# Patient Record
Sex: FEMALE | Race: WHITE
[De-identification: names, ages, dates, MRNs, and addresses within clinical notes are randomized per-mention and may not be internally consistent; named-entity substitution may affect disease eponyms.]

---

## 2018-01-29 ENCOUNTER — HOSPITAL ENCOUNTER (OUTPATIENT)
Dept: HOSPITAL 25 - ED | Age: 53
Setting detail: OBSERVATION
LOS: 1 days | Discharge: HOME | End: 2018-01-30
Attending: HOSPITALIST | Admitting: HOSPITALIST
Payer: COMMERCIAL

## 2018-01-29 ENCOUNTER — HOSPITAL ENCOUNTER (EMERGENCY)
Dept: HOSPITAL 25 - UCEAST | Age: 53
Discharge: ANOTHER HOSPITAL | End: 2018-01-29
Payer: COMMERCIAL

## 2018-01-29 VITALS — DIASTOLIC BLOOD PRESSURE: 112 MMHG | SYSTOLIC BLOOD PRESSURE: 191 MMHG

## 2018-01-29 DIAGNOSIS — R51: ICD-10-CM

## 2018-01-29 DIAGNOSIS — R42: ICD-10-CM

## 2018-01-29 DIAGNOSIS — R04.0: ICD-10-CM

## 2018-01-29 DIAGNOSIS — I16.0: ICD-10-CM

## 2018-01-29 DIAGNOSIS — H92.09: ICD-10-CM

## 2018-01-29 DIAGNOSIS — R42: Primary | ICD-10-CM

## 2018-01-29 DIAGNOSIS — H66.90: Primary | ICD-10-CM

## 2018-01-29 DIAGNOSIS — Z88.2: ICD-10-CM

## 2018-01-29 DIAGNOSIS — J01.90: ICD-10-CM

## 2018-01-29 DIAGNOSIS — H65.92: ICD-10-CM

## 2018-01-29 DIAGNOSIS — R11.0: ICD-10-CM

## 2018-01-29 DIAGNOSIS — R06.02: ICD-10-CM

## 2018-01-29 DIAGNOSIS — I10: ICD-10-CM

## 2018-01-29 LAB
BASOPHILS # BLD AUTO: 0.1 10^3/UL (ref 0–0.2)
EOSINOPHIL # BLD AUTO: 0.2 10^3/UL (ref 0–0.6)
HCT VFR BLD AUTO: 40 % (ref 35–47)
HGB BLD-MCNC: 13.6 G/DL (ref 12–16)
LYMPHOCYTES # BLD AUTO: 2.1 10^3/UL (ref 1–4.8)
MCH RBC QN AUTO: 30 PG (ref 27–31)
MCHC RBC AUTO-ENTMCNC: 34 G/DL (ref 31–36)
MCV RBC AUTO: 88 FL (ref 80–97)
MONOCYTES # BLD AUTO: 0.6 10^3/UL (ref 0–0.8)
NEUTROPHILS # BLD AUTO: 5.6 10^3/UL (ref 1.5–7.7)
NRBC # BLD AUTO: 0 10^3/UL
NRBC BLD QL AUTO: 0.1
PLATELET # BLD AUTO: 331 10^3/UL (ref 150–450)
RBC # BLD AUTO: 4.56 10^6/UL (ref 4–5.4)
WBC # BLD AUTO: 8.6 10^3/UL (ref 3.5–10.8)

## 2018-01-29 PROCEDURE — 82550 ASSAY OF CK (CPK): CPT

## 2018-01-29 PROCEDURE — 99285 EMERGENCY DEPT VISIT HI MDM: CPT

## 2018-01-29 PROCEDURE — 86140 C-REACTIVE PROTEIN: CPT

## 2018-01-29 PROCEDURE — 93005 ELECTROCARDIOGRAM TRACING: CPT

## 2018-01-29 PROCEDURE — 83880 ASSAY OF NATRIURETIC PEPTIDE: CPT

## 2018-01-29 PROCEDURE — G0480 DRUG TEST DEF 1-7 CLASSES: HCPCS

## 2018-01-29 PROCEDURE — 83735 ASSAY OF MAGNESIUM: CPT

## 2018-01-29 PROCEDURE — 83605 ASSAY OF LACTIC ACID: CPT

## 2018-01-29 PROCEDURE — 80320 DRUG SCREEN QUANTALCOHOLS: CPT

## 2018-01-29 PROCEDURE — G0463 HOSPITAL OUTPT CLINIC VISIT: HCPCS

## 2018-01-29 PROCEDURE — G0378 HOSPITAL OBSERVATION PER HR: HCPCS

## 2018-01-29 PROCEDURE — 36415 COLL VENOUS BLD VENIPUNCTURE: CPT

## 2018-01-29 PROCEDURE — 71046 X-RAY EXAM CHEST 2 VIEWS: CPT

## 2018-01-29 PROCEDURE — 87502 INFLUENZA DNA AMP PROBE: CPT

## 2018-01-29 PROCEDURE — 80053 COMPREHEN METABOLIC PANEL: CPT

## 2018-01-29 PROCEDURE — 70450 CT HEAD/BRAIN W/O DYE: CPT

## 2018-01-29 PROCEDURE — 99213 OFFICE O/P EST LOW 20 MIN: CPT

## 2018-01-29 PROCEDURE — 84443 ASSAY THYROID STIM HORMONE: CPT

## 2018-01-29 PROCEDURE — 84484 ASSAY OF TROPONIN QUANT: CPT

## 2018-01-29 PROCEDURE — 85025 COMPLETE CBC W/AUTO DIFF WBC: CPT

## 2018-01-29 RX ADMIN — BUPROPION HYDROCHLORIDE SCH MG: 300 TABLET, FILM COATED, EXTENDED RELEASE ORAL at 17:25

## 2018-01-29 RX ADMIN — MECLIZINE HYDROCHLORIDE SCH MG: 12.5 TABLET ORAL at 21:33

## 2018-01-29 RX ADMIN — Medication SCH: at 21:34

## 2018-01-29 NOTE — RAD
INDICATION: Dizziness     



COMPARISON: April 04, 2012

 

TECHNIQUE: PA and lateral dual-energy views were obtained.



FINDINGS: 



Bones/Soft Tissues: There are no acute bony findings.    



Cardiomediastinal: The cardiomediastinal silhouette is normal. 



Lungs: There are no infiltrates.



Pleura: There are no pleural effusions. 



Other: None



IMPRESSION:  NO ACTIVE DISEASE.

## 2018-01-29 NOTE — HP
HISTORY AND PHYSICAL:

 

DATE OF ADMISSION:  01/29/18

 

ADMITTING PROVIDER:  Srikanth Pompa MD

 

PRIMARY CARE PROVIDER:  Dr. Holland Mullins of [place].

 

CHIEF COMPLAINT:  Dizziness with vertigo for 4 days, nausea, headaches, sinus 
pressure, inability to ambulate, hypertension.

 

HISTORY OF PRESENT ILLNESS:  Sangeeta Alva is a 52-year-old female with past 
medical history of hypertension, allergies, PTSD, anxiety, depression, also 
morbid obesity, who presents with 4 days of vertigo that seem to be getting 
better, has been taking meclizine for the last 3 days, but then it got worse 
again and presented to [facility] with blood pressures 199/112, 
additionally has had a headache, nausea, but no vomiting.  Denies any current 
shortness of breath.  Has been feeling hot, alternating with cold.  Feels 
pressure in her front sinuses and bilateral ears.  She had stopped taking her 
Ativan 1 mg b.i.d. versus t.i.d., which she takes for PTSD as she was afraid 
that it might make her feel worse.  She upon presentation to the ED, had a CT 
head which was negative, chest x-ray which is negative, lab work which is 
relatively unremarkable except for a BNP which is 527. She got Valium, ketorolac
, and Zofran in the ED, but continued to feel like she could not ambulate.  She 
is being admitted for inability to ambulate in the setting of vertigo and 
recent hypertensive urgency.

 

PAST MEDICAL HISTORY:  Anxiety, depression, PTSD, hypertension, allergies 
versus ?asthma.

 

MEDICATIONS:  Include:

1.  Propranolol 40 mg p.o. daily.

2.  Singulair 10 mg p.o. daily.

3.  Ativan 1 mg p.o. t.i.d. to b.i.d. p.r.n.

4.  Beclomethasone dipropionate 80 mcg nasal daily.

5.  Phentermine HCL 37.5 mg p.o. b.i.d.

6.  Bupropion 300 mg p.o. daily.

7.  Symbicort 2 puffs inhaled b.i.d.

8.  Meclizine 25 mg p.o. t.i.d.

 

ALLERGIES:  SULFA ANTIBIOTICS gives hives, IODINE gives swelling and shortness 
of breath.

 

FAMILY HISTORY:  Maternal grandmother with breast cancer.  Mother with lung 
cancer. Maternal grandfather with colon cancer.

 

SOCIAL HISTORY:  The patient helps to make medical devices.  She is a never 
smoker, never drinker.  No drug use.  She wishes her son, Jan Estrada and 
daughter Monica Estrada to be her medical surrogate decision makers.  She is a 
full code.

 

REVIEW OF SYSTEMS:  The patient also attests to 2 weeks of frontal sinus and 
ear pressure pain and 3 weeks of bloody (scant, noticed with tissues) nose daily
, but not perfused.  She has a history of 2 cautery procedures for nose bleed 
in her childhood.

 

                               PHYSICAL EXAMINATION

 

GENERAL:  The patient is slightly uncomfortable appearing in the hospital bed.

 

VITAL SIGNS:  Heart rate is 96; blood pressure here initially 164/116, before 
199/112 at [facility], currently 

150/92; satting 96% to 98% on room air; temperature 99.1.

 

HEENT:  Normocephalic, atraumatic.  Pupils equal, round, reactive to light.  No 
cervical lymphadenopathy.  No oropharynx lesions.

 

NECK:  Supple.

 

PULMONARY:  Clear to auscultation bilaterally with no wheezing, rales, or 
rhonchi.

 

CARDIOVASCULAR:  Regular rate and rhythm.  No murmurs, rubs, or gallops.

 

ABDOMEN:  Soft, nontender, nondistended.  Morbidly obese.  No rebound, no 
guarding. No Her's sign.

 

EXTREMITIES:  Warm and well perfused.  No peripheral edema.

 

NEUROLOGICAL:  Moving all extremities.  Cranial nerves II through XII intact.

 

SKIN:  No lesions, no rashes.

 

 LABORATORY DATA:  White count 8.6, hemoglobin 13.6, hematocrit 40, platelets 
331,000.  Sodium 138, potassium 3.9, chloride 105, carbon dioxide 27, glucose 97
, magnesium 2.0, AST 29, ALT 40, alk phos 87, , CRP 7.50, TSH 4.68.  
Influenza swabs A and B both negative.  Troponin 0.00.

 

IMAGING:  Chest x-ray, no acute process.  CT of head, no acute process.  



EKG with Q-wave in 3, borderline Q-wave in 2, unchanged.  Poor R-wave 
progression.  No ST changes.

 

ASSESSMENT AND PLAN:  Sangeeta Alva is a 52-year-old female with past medical 
history of anxiety, depression, posttraumatic stress disorder, nosebleeds, 
vertigo, who is presenting with hypertensive urgency 199/112 at urgent care. 4 
days of vertigo and inability to ambulate despite therapies in the ED which 
included meclizine, Valium and ketorolac and Benadryl.  The patient will be 
admitted to observation status to monitor her blood pressure, continue meclizine
, get Physical Therapy to work with her. Unable in ER to do a full [place] 
maneuver test. No current evidence of nystagmus on exam.  Continue Reglan p.o. 
p.r.n. for nausea. Continue her Ativan which she notably stopped over the 
weekend that may be contributing to some of her worsening feelings given her 
chronic use of 1 b.i.d. to t.i.d.  We will continue her home propranolol 40 mg 
daily, add hydralazine 10 mg IV q.2 hours p.r.n. for blood pressures above 180.
  Continue her bupropion at 300 mg for depression and her inhalers.  She is a 
full code and medical surrogates are son, Jan Estrada and Monica Estrada, her 
daughter.  She can eat a regular diet.



 

[id number]/CPS #: [id number]

TEMITOPE

## 2018-01-29 NOTE — RAD
Indication: Dizziness.



CT of the brain was performed without IV contrast.



Ventricular structures are midline. No midline shift is noted. The extra-axial spaces are

unremarkable. There is no evidence of intracranial mass or hemorrhage. No other high or

low density lesions identified.



Mastoid air cells and paranasal sinuses are unremarkable.



IMPRESSION: There is no evidence of intracranial mass or hemorrhage noted.

## 2018-01-29 NOTE — ED
Jose Maria JENSEN Angela, scribed for Ezra Rojas MD on 01/29/18 at 1020 .





Dizziness





- HPI Summary


HPI Summary: 





This pt is a 51 y/o female presenting to [facility] via EMS from [facility] c/o 

dizziness and headache since 1/26/18. Pt reports associated symptoms of headache

, nausea, epistaxis, feeling cold, diaphoresis, sinus pain, and mild SOB. She 

rates her headache 8/10 in severity and located on the bottom of her head. Pt 

denies fever, sore throat, rhinorrhea, chest pain.


Pt reports she has not taken any antihypertensive medications because she was 

sick. 





- History Of Current Complaint


Stated Complaint: DIZZINESS-SENT FROM 


Hx Obtained From: Patient


Onset/Duration: Still Present


Timing: Days


Severity Currently: Severe - 8/10


Character: Dizzy


Aggravating Factor(s): Nothing


Alleviating Factor(s): Nothing


Associated Signs And Symptoms: Positive: Nausea, Diaphoresis, SOB - mild, Chills

, Other: - POS: headache, epistaxis, sinus pain..  Negative: Vomiting, Chest 

Pain, Fever





- Allergies/Home Medications


Allergies/Adverse Reactions: 


 Allergies











Allergy/AdvReac Type Severity Reaction Status Date / Time


 


Sulfa Antibiotics Allergy Unknown Hives Verified 05/06/16 12:16


 


Iodine Allergy  Swelling/SHORT Verified 05/06/16 12:16





   OF BREATH  











Home Medications: 


 Home Medications





Beclomethasone Dipropionate (N [Qnasl] 80 mcg NASAL DAILY 01/29/18 [History 

Confirmed 01/29/18]


BuPROPion XL* [Bupropion XL*] 300 mg PO DAILY 01/29/18 [History Confirmed 01/29/ 18]


Budesonide/Formote 160/4.5(NF) [Symbicort 160/4.5 (NF)] 2 puff INH BID 01/29/18 

[History Confirmed 01/29/18]


Montelukast Sodium TAB* [Singulair TAB*] 10 mg PO DAILY 01/29/18 [History 

Confirmed 01/29/18]


Phentermine HCl [Adipex-P] 37.5 mg PO BID 01/29/18 [History Confirmed 01/29/18]











PMH/Surg Hx/FS Hx/Imm Hx


Endocrine/Hematology History: 


   Denies: Hx Diabetes, Hx Thyroid Disease


Cardiovascular History: Reports: Hx Hypertension


   Denies: Hx Pacemaker/ICD


Respiratory History: 


   Denies: Hx Asthma, Hx Chronic Obstructive Pulmonary Disease (COPD)


GI History: 


   Denies: Hx Ulcer


 History: 


   Denies: Hx Renal Disease


Sensory History: 


   Denies: Hx Hearing Aid


Psychiatric History: Reports: Hx Panic Disorder - PTSD/ANXIETY





- Cancer History


Hx Chemotherapy: No


Hx Radiation Therapy: No





- Surgical History


Surgery Procedure, Year, and Place: GALLBLADDER ,CSECTION 3049-2650,HYSTERECTOMY

,TUBAL; ear tubes; tonsilectomy





- Immunization History


Date of Tetanus Vaccine: pt states unsure


Date of Influenza Vaccine: none


Infectious Disease History: 


   Denies: Hx Clostridium Difficile, Hx Hepatitis, Hx Human Immunodeficiency 

Virus (HIV), Hx of Known/Suspected MRSA, Hx Shingles, Hx Tuberculosis, Hx Known/

Suspected VRE, Hx Known/Suspected VRSA, History Other Infectious Disease, 

Traveled Outside the [place] in Last 30 Days





- Family History


Known Family History: Positive: Hypertension, Diabetes, Other - CA





- Social History


Alcohol Use: Occasionally


Substance Use Type: Reports: None


Smoking Status (MU): Never Smoked Tobacco


Have You Smoked in the Last Year: No





Review of Systems


Positive: Chills, Skin Diaphoresis.  Negative: Fever


ENT: Other - sinus tenderness


Positive: Epistaxis.  Negative: Sore Throat, Nasal Discharge


Negative: Chest Pain


Positive: Shortness Of Breath - mild


Positive: Nausea.  Negative: Vomiting


Neurological: Other - POS: dizziness


Positive: Headache


All Other Systems Reviewed And Are Negative: Yes





Physical Exam





- Summary


Physical Exam Summary: 





VITAL SIGNS: Reviewed. 


GENERAL:  Patient is an obese female who is lying comfortable in the stretcher.

  Patient is not in any acute distress.


HEAD AND FACE: No signs of trauma.  No ecchymosis, hematomas or skull 

depressions. Pt has maxillary and ethmoid sinus tenderness.


EYES: PERRLA, EOMI x 2, No injected conjunctiva, no nystagmus. No photophobia.


EARS: Hearing grossly intact. Ear canals and tympanic membranes are within 

normal limits. 


MOUTH: Oropharynx within normal limits. 


NECK: Supple, trachea is midline, no adenopathy, no JVD, no carotid bruit, no c-

spine tenderness, neck with full ROM. No meningeal signs, no Kernig's or 

brudzinskis signs. 


CHEST: Symmetric, no tenderness at palpation 


LUNGS: Clear to auscultation bilaterally. No wheezing or crackles.


CVS: Regular rate and rhythm, S1 and S2 present, no murmurs or gallops 

appreciated. 


ABDOMEN: Soft, non-tender. No signs of distention. No rebound no guarding, and 

no masses palpated. Bowel sounds are normal. 


EXTREMITIES: FROM in all major joints, no edema, no cyanosis or clubbing.


NEURO: Alert and oriented x 3. No acute neurological deficits. Speech is normal 

and follows commands. 


SKIN: Dry and warm


GCS: 15


Triage Information Reviewed: Yes


Vital Signs On Initial Exam: 


 Initial Vitals











Temp Pulse Resp BP Pulse Ox


 


 99.1 F   96   12   164/116   98 


 


 01/29/18 10:19  01/29/18 10:19  01/29/18 10:19  01/29/18 10:19  01/29/18 10:19











Vital Signs Reviewed: Yes





Diagnostics





- Vital Signs


 Vital Signs











  Temp Pulse Resp BP Pulse Ox


 


 01/29/18 15:28  98.6 F  90  16  141/82  95


 


 01/29/18 15:00   82   141/82  97


 


 01/29/18 14:30   100   150/92  96


 


 01/29/18 14:26   98   143/88  97


 


 01/29/18 14:00   95    97


 


 01/29/18 13:58    22  


 


 01/29/18 13:41   118   162/102 


 


 01/29/18 13:33   105   162/102  96


 


 01/29/18 13:32   82    98


 


 01/29/18 13:30     138/82 


 


 01/29/18 13:00     154/91 


 


 01/29/18 12:46     159/88 


 


 01/29/18 11:50   93    97


 


 01/29/18 11:30    15  139/67 


 


 01/29/18 11:19    14  


 


 01/29/18 11:00   94  16  145/79  98


 


 01/29/18 10:30   91  12  150/124  97


 


 01/29/18 10:27   95  15  146/126  97


 


 01/29/18 10:24   95  11   97


 


 01/29/18 10:19  99.1 F  96  12  164/116  98














- Laboratory


Lab Results: 


 Lab Results











  01/29/18 01/29/18 01/29/18 Range/Units





  11:12 11:12 11:12 


 


WBC    8.6  (3.5-10.8)  10^3/ul


 


RBC    4.56  (4.0-5.4)  10^6/ul


 


Hgb    13.6  (12.0-16.0)  g/dl


 


Hct    40  (35-47)  %


 


MCV    88  (80-97)  fL


 


MCH    30  (27-31)  pg


 


MCHC    34  (31-36)  g/dl


 


RDW    14  (10.5-15)  %


 


Plt Count    331  (150-450)  10^3/ul


 


MPV    8  (7.4-10.4)  um3


 


Neut % (Auto)    65.3  (38-83)  %


 


Lymph % (Auto)    24.2 L  (25-47)  %


 


Mono % (Auto)    7.3  (1-9)  %


 


Eos % (Auto)    2.3  (0-6)  %


 


Baso % (Auto)    0.9  (0-2)  %


 


Absolute Neuts (auto)    5.6  (1.5-7.7)  10^3/ul


 


Absolute Lymphs (auto)    2.1  (1.0-4.8)  10^3/ul


 


Absolute Monos (auto)    0.6  (0-0.8)  10^3/ul


 


Absolute Eos (auto)    0.2  (0-0.6)  10^3/ul


 


Absolute Basos (auto)    0.1  (0-0.2)  10^3/ul


 


Absolute Nucleated RBC    0  10^3/ul


 


Nucleated RBC %    0.1  


 


Sodium  138    (133-145)  mmol/L


 


Potassium  3.9    (3.5-5.0)  mmol/L


 


Chloride  105    (101-111)  mmol/L


 


Carbon Dioxide  27    (22-32)  mmol/L


 


Anion Gap  6    (2-11)  mmol/L


 


BUN  12    (6-24)  mg/dL


 


Creatinine  0.85    (0.51-0.95)  mg/dL


 


Est GFR ( Amer)  90.7    (>60)  


 


Est GFR (Non-Af Amer)  70.5    (>60)  


 


BUN/Creatinine Ratio  14.1    (8-20)  


 


Glucose  97    ()  mg/dL


 


Lactic Acid     (0.5-2.0)  mmol/L


 


Calcium  10.0    (8.6-10.3)  mg/dL


 


Magnesium  2.0    (1.9-2.7)  mg/dL


 


Total Bilirubin  0.40    (0.2-1.0)  mg/dL


 


AST  29    (13-39)  U/L


 


ALT  40    (7-52)  U/L


 


Alkaline Phosphatase  87    ()  U/L


 


Total Creatine Kinase  58    ()  U/L


 


Troponin I  0.00    (<0.04)  ng/mL


 


C-Reactive Protein  7.50 H    (< 5.00)  mg/L


 


B-Natriuretic Peptide   527 H  ( - 100) pg/mL


 


Total Protein  7.5    (6.4-8.9)  g/dL


 


Albumin  4.2    (3.2-5.2)  g/dL


 


Globulin  3.3    (2-4)  g/dL


 


Albumin/Globulin Ratio  1.3    (1-3)  


 


TSH  4.68    (0.34-5.60)  mcIU/mL


 


Serum Alcohol  < 10    (<10)  mg/dL


 


Influenza A (Rapid)     (Negative)  


 


Influenza B (Rapid)     (Negative)  














  01/29/18 01/29/18 Range/Units





  11:12 11:56 


 


WBC    (3.5-10.8)  10^3/ul


 


RBC    (4.0-5.4)  10^6/ul


 


Hgb    (12.0-16.0)  g/dl


 


Hct    (35-47)  %


 


MCV    (80-97)  fL


 


MCH    (27-31)  pg


 


MCHC    (31-36)  g/dl


 


RDW    (10.5-15)  %


 


Plt Count    (150-450)  10^3/ul


 


MPV    (7.4-10.4)  um3


 


Neut % (Auto)    (38-83)  %


 


Lymph % (Auto)    (25-47)  %


 


Mono % (Auto)    (1-9)  %


 


Eos % (Auto)    (0-6)  %


 


Baso % (Auto)    (0-2)  %


 


Absolute Neuts (auto)    (1.5-7.7)  10^3/ul


 


Absolute Lymphs (auto)    (1.0-4.8)  10^3/ul


 


Absolute Monos (auto)    (0-0.8)  10^3/ul


 


Absolute Eos (auto)    (0-0.6)  10^3/ul


 


Absolute Basos (auto)    (0-0.2)  10^3/ul


 


Absolute Nucleated RBC    10^3/ul


 


Nucleated RBC %    


 


Sodium    (133-145)  mmol/L


 


Potassium    (3.5-5.0)  mmol/L


 


Chloride    (101-111)  mmol/L


 


Carbon Dioxide    (22-32)  mmol/L


 


Anion Gap    (2-11)  mmol/L


 


BUN    (6-24)  mg/dL


 


Creatinine    (0.51-0.95)  mg/dL


 


Est GFR ( Amer)    (>60)  


 


Est GFR (Non-Af Amer)    (>60)  


 


BUN/Creatinine Ratio    (8-20)  


 


Glucose    ()  mg/dL


 


Lactic Acid  0.8   (0.5-2.0)  mmol/L


 


Calcium    (8.6-10.3)  mg/dL


 


Magnesium    (1.9-2.7)  mg/dL


 


Total Bilirubin    (0.2-1.0)  mg/dL


 


AST    (13-39)  U/L


 


ALT    (7-52)  U/L


 


Alkaline Phosphatase    ()  U/L


 


Total Creatine Kinase    ()  U/L


 


Troponin I    (<0.04)  ng/mL


 


C-Reactive Protein    (< 5.00)  mg/L


 


B-Natriuretic Peptide   ( - 100) pg/mL


 


Total Protein    (6.4-8.9)  g/dL


 


Albumin    (3.2-5.2)  g/dL


 


Globulin    (2-4)  g/dL


 


Albumin/Globulin Ratio    (1-3)  


 


TSH    (0.34-5.60)  mcIU/mL


 


Serum Alcohol    (<10)  mg/dL


 


Influenza A (Rapid)   Negative  (Negative)  


 


Influenza B (Rapid)   Negative  (Negative)  











Result Diagrams: 


 01/29/18 11:12





 01/29/18 11:12


Lab Statement: Any lab studies that have been ordered have been reviewed, and 

results considered in the medical decision making process.





- Radiology


  ** chest XR


Xray Interpretation: No Acute Changes - IMPRESSION: No active disease.  has reviewed this radiology report.


Radiology Interpretation Completed By: Radiologist





- CT


  ** Brain CT


CT Interpretation: No Acute Changes - IMPRESSION: there is no evidence of 

intracranial mass or hemorrhage noted.  has reviewed this radiology 

report.


CT Interpretation Completed By: Radiologist





- EKG


  ** 11:29


Cardiac Rate: NL


EKG Rhythm: Sinus Rhythm - at 89 bpm


EKG Interpretation: No ST elevation. Q wave in II and III.





Re-Evaluation





- Re-Evaluation


  ** First Eval


Re-Evaluation Time: 12:40


Comment: I reviewed XR, CT, and lab results with the pt.





Dizzy Course/Dx





- Course


Course Of Treatment: This pt is a 51 y/o female presenting to [facility] via EMS 

from [facility] c/o dizziness and headache since 1/26/18. Pt reports associated 

symptoms of headache, nausea, epistaxis, feeling cold, diaphoresis, sinus pain, 

and mild SOB. She rates her headache 8/10 in severity and located on the bottom 

of her head. Pt denies fever, sore throat, rhinorrhea, chest pain.  Pt reports 

she has not taken any antihypertensive medications because she was sick.  Test 

results without any significant abnormalities except for CRP of 7.5 and BNP of 

527. Influenza A and B are negative. Chest XR is negative for acute pathology:  

Head CT shows there is no evidence of intracranial mass or hemorrhage noted.  I 

believe her symptoms are secondary to sinusitis as well as her vertigo. The pt 

was given Ativan for her anxiety and her blood pressure normalized. I was about 

to discharge the pt home because she reported she felt better. As soon as she 

stood up next to  her bed she felt very dizzy. She continues to have dizziness, 

which she describes as room spinning around her. Pt reports that when she lays 

down and closes her eyes, her dizziness improves. In the physical exam she has 

no nystagmus, therefore I believe the pt has peripheral dizziness. I 

additionally I gave the pt valium but her symptoms continued. I discussed the pt

s case with Dr. Pompa, hospitalist, who has accepted the pt for admission.  Pt 

is hemodynamically stable, alert and oriented x3.





- Diagnoses


Differential Diagnosis/HQI/PQRI: Benign Paroxysmal Positional Vertigo, CVA, 

Meniere's Disease, Transient Ischemic Attack, Vasovagal Reaction


Provider Diagnoses: 


 Intractable vertigo, Acute sinusitis








- Provider Notifications


Discussed Care Of Patient With: Srikanth Pompa


Time Discussed With Above Provider: 13:26


Instructed by Provider To: Other - I discussed pt care with Dr. Pompa, 

hospitalist, who has agreed to admit the pt.





Discharge





- Discharge Plan


Condition: Stable


Disposition: ADMITTED TO [facility] documentation as recorded by the Jose Maria prince Angela accurately reflects 

the service I personally performed and the decisions made by me, Ezra Rojas MD.

## 2018-01-29 NOTE — UC
Rodolfo JENSEN Julia, scribed for Kirill Whitlock MD on 01/29/18 at 0813 .





 General HPI





- HPI Summary


HPI Summary: 





This patient is a 52 year old F [place] [facility] chief complaint of 

temporal and occipital headache and dizziness since 1/19/18. Symptoms 

aggravated by lights. Patient reports diaphoresis, sinus infection, epitaxies, 

intermittent ear pain, and generalized weakness for the past two weeks. Pt 

reports vertigo like symptoms with sinus infections. Pt reports nausea with 

movement. Pt takes Meclizine for previous similar symptoms. Pt usually takes 

medication for HTN but has not since 1/19/18 due to vertigo like symptoms.





- History of Current Complaint


Stated Complaint: dizziness, and sinus congestion


Hx Obtained From: Patient


Onset/Duration: Lasting Days, Lasting Weeks


Timing: Constant


Pain Location at: head and sinuses


Character: dizziness


Aggravating: lights


Associated Signs & Symptoms: Positive: Other - diaphoresis, sinus infection, 

epitaxies, intermittent ear pain, and generalized weakness


Similar Episode/Dx as: vertigo





- Allergy/Home Medications


Allergies/Adverse Reactions: 


 Allergies











Allergy/AdvReac Type Severity Reaction Status Date / Time


 


Sulfa Antibiotics Allergy Unknown Hives Verified 05/06/16 12:16


 


Iodine Allergy  Swelling/SHORT Verified 05/06/16 12:16





   OF BREATH  











Home Medications: 


 Home Medications





Propranolol TAB* [Inderal TAB*]  01/29/18 [History]











PMH/Surg Hx/FS Hx/Imm Hx


Cardiovascular History: Hypertension





- Surgical History


Surgical History: Yes


Surgery Procedure, Year, and Place: GALLBLADDER ,CSECTION 0262-2715,HYSTERECTOMY

,TUBAL; ear tubes; tonsilectomy





- Family History


Known Family History: Positive: Hypertension, Diabetes, Other - CA





- Social History


Alcohol Use: Occasionally


Substance Use Type: None


Smoking Status (MU): Never Smoked Tobacco


Have You Smoked in the Last Year: No





Review of Systems


Constitutional: Fatigue


Skin: Other - diaphoresis


ENT: Epistaxis, Ear Ache, Sinus Congestion - infection, Sinus Pain/Tenderness


Neurological: Headache, Other - dizziness


All Other Systems Reviewed And Are Negative: Yes





Physical Exam


Triage Information Reviewed: Yes


Vital Signs: 


 Initial Vital Signs











Temp  97.6 F   01/29/18 08:12


 


Pulse  106   01/29/18 08:12


 


Resp  22   01/29/18 08:12


 


BP  191/112   01/29/18 08:12


 


Pulse Ox  98   01/29/18 08:12











Vital Signs Reviewed: Yes





- Additional Comments





General: well-appearing, moderate pain distress secondary to dizziness


Skin: warm, color reflects adequate perfusion, dry


Head: normal


Eyes: EOMI, UMM


ENT:  oral pharynx is moist, L serous otitis media


Neck: supple, nontender


Respiratory: CTA, breath sounds present


Cardiovascular: Regular rhythm with tachycardia rate


Abdomen: soft, nontender


Bowel: present


Musculoskeletal: normal, strength/ROM intact


Neurological: normal, sensory/motor intact, A&O x3, no focal neurological 

deficit


Psychological: affect/mood appropriate








Diagnostics





- EKG


Cardiac Rate: Tachycardia


Cardiac Rhythm: Sinus: Normal - 102 BPM at 8:33


Ectopy: None


ST Segment: Normal





Course/Dx





- Course


Course Of Treatment: INITIAL PRESENTATION WAS SINUSITIS AND VERTIGO THEREFORE, 

NO EKG TAKEN UPON ARRIVAL. EKG ORDERED AFTER EVAL WHICH INCLUDED HYPERTENSION 

TACHYCARDIA.  DISCUSSED GOING TO THE ED FOR FURTEHR EVALUATION AND TREATMENT.  

POSTERIOR CVA IS IN THE DDX; THIS WAS DISCUSSED WITH THE PATIENT.  SHE WILL GO 

TO THE ED BY AMBULANCE.





- Differential Dx - Multi-Symptom


Provider Diagnoses: DIZZINESS.  HEADACHE.  HYPERTENSION.  LEFT SEROUS OTITIS 

MEDIA





Discharge





- Discharge Plan


Condition: Fair


Disposition: TRANS HIGHER LVL OF CARE FAC


Patient Education Materials:  Acute Headache (ED), Hypertension (ED), Dizziness 

(ED)


Referrals: 


Nancy Mullins MD [Primary Care Provider] - 


Additional Instructions: 


GO DIRECTLY TO THE EMERGENCY DEPARTMENT FOR FURTHER EVALUATION AND CARE FOR 

YOUR HEADACHE, DIZZINESS AND HYPERTENSION.





The documentation as recorded by the Rodolfo prince Julia accurately reflects 

the service I personally performed and the decisions made by me, Kirill Whitlock MD.

## 2018-01-30 VITALS — SYSTOLIC BLOOD PRESSURE: 138 MMHG | DIASTOLIC BLOOD PRESSURE: 72 MMHG

## 2018-01-30 RX ADMIN — Medication SCH: at 07:40

## 2018-01-30 RX ADMIN — MECLIZINE HYDROCHLORIDE SCH MG: 12.5 TABLET ORAL at 14:15

## 2018-01-30 RX ADMIN — MECLIZINE HYDROCHLORIDE SCH MG: 12.5 TABLET ORAL at 06:26

## 2018-01-30 RX ADMIN — BUPROPION HYDROCHLORIDE SCH MG: 300 TABLET, FILM COATED, EXTENDED RELEASE ORAL at 09:33

## 2018-01-31 NOTE — DS
DISCHARGE SUMMARY:

 

DATE OF ADMISSION:  01/29/18

 

DATE OF DISCHARGE:  01/30/18

 

ADMITTING AND ATTENDING PHYSICIAN:  Srikanth Pompa MD

 

PRIMARY CARE PROVIDER:  Dr. Holland Mullins of [place].

 

CHIEF COMPLAINT:  Dizziness with vertigo x4 days, nausea, headaches, sinus 
pressure, ear pain, hypertension.

 

PRINCIPAL DIAGNOSES:

1.  Otitis media.

2.  Hypertensive urgency.

3.  Nose bleed.

4.  Vertigo.

 

HISTORY OF PRESENT ILLNESS AND HOSPITAL COURSE:  Sangeeta Alva is a 52-year-old 
female with past medical history of hypertension, allergies, PTSD, anxiety, 
depression, morbid obesity, nose bleeds as a child, status post 2 cautery 
procedures, who presents with 4 days of vertigo, had been taking her home 
meclizine for 3 days with some stabilization but then she states it got worse 
again.  She presented to [facility] and was noted have blood pressures 199/
112.  This was associated with headache, nausea, with no vomiting, nose bleeds 
for 3 weeks, though not profuse.  She had a history of what she describes as 
weekly sinus infections a year prior and had followed up with ENT/asthma 
specialist and got an imaging about 2 months ago.  Upon presentation to the ED, 
she had CT head which was negative, chest x-ray which was negative, lab work 
which was unremarkable except for a BNP, which is 527.  She received Valium, 
ketorolac, and Zofran and had gotten her meclizine prior but still did not feel 
safe for ambulation.  She was admitted to observation status for vertigo, 
inability to ambulate, recent  hypertensive urgency.  She saw Physical Therapy 
next day, was continued on her Ativan 1 mg p.o. b.i.d. to t.i.d., which she had 
stopped over the weekend along with Zofran p.r.n. Her symptoms, though not 
resolving, were improved and she was able to ambulate with physical therapy.  
On evaluation of her bilateral ears, she had slight effusion in the inferior 
aspect bilaterally.  No bleed on CT brain.  There was no mention of any 
abnormality of the mastoid and paranasal sinuses.  She is being discharged with 
followup with her primary care, Dr. Mullins, and also recommended to follow up 
with Ear, Nose, and Throat for her nose bleeds.  Notably, her hemoglobin on 
admission was 13.6.  Vital signs were stable and she describes what sounds like 
not a very profuse nose bleed with trace bleeding when she blows her nose.

 

MEDICATIONS:  Include:

1.  Augmentin 875 mg p.o. b.i.d. for 5 days.

2.  QNASL 80 mcg nasal daily.

3.  Symbicort 2 puffs inhaled b.i.d.

4.  Bupropion 300 mg p.o. daily.

5.  Ativan 1 mg p.o. b.i.d. to t.i.d. p.r.n.

6.  Meclizine 25 mg p.o. t.i.d.

7.  Singulair 10 mg p.o. daily.

8.  Phentermine HCL (Adipex-P) 37.5 mg p.o. b.i.d.

9.  Propranolol 40 mg p.o. daily.

 

ACTIVITY LEVEL:  No restrictions.

 

DISCHARGE DIET:  No restrictions.

 

FOLLOWUP:  Please follow up with Dr. Holland Mullins on 02/02/18 at 03:30 p.m. and 
with her Ear, Nose, and Throat specialist if nose bleeds continue.

 

TIME SPENT ON DISCHARGE:  30 minutes.

 

 [id number]/CPS #: [id number]

MTDD

## 2019-08-12 ENCOUNTER — HOSPITAL ENCOUNTER (EMERGENCY)
Dept: HOSPITAL 25 - UCEAST | Age: 54
Discharge: HOME HEALTH | End: 2019-08-12
Payer: COMMERCIAL

## 2019-08-12 ENCOUNTER — HOSPITAL ENCOUNTER (EMERGENCY)
Dept: HOSPITAL 25 - ED | Age: 54
Discharge: HOME | End: 2019-08-12
Payer: COMMERCIAL

## 2019-08-12 VITALS — SYSTOLIC BLOOD PRESSURE: 136 MMHG | DIASTOLIC BLOOD PRESSURE: 62 MMHG

## 2019-08-12 DIAGNOSIS — I10: ICD-10-CM

## 2019-08-12 DIAGNOSIS — R11.2: ICD-10-CM

## 2019-08-12 DIAGNOSIS — N20.0: Primary | ICD-10-CM

## 2019-08-12 DIAGNOSIS — Z88.2: ICD-10-CM

## 2019-08-12 DIAGNOSIS — M54.9: ICD-10-CM

## 2019-08-12 DIAGNOSIS — Z90.49: ICD-10-CM

## 2019-08-12 DIAGNOSIS — K86.9: ICD-10-CM

## 2019-08-12 DIAGNOSIS — R10.9: Primary | ICD-10-CM

## 2019-08-12 DIAGNOSIS — R11.10: ICD-10-CM

## 2019-08-12 DIAGNOSIS — R10.84: ICD-10-CM

## 2019-08-12 LAB
ALBUMIN SERPL BCG-MCNC: 4.2 G/DL (ref 3.2–5.2)
ALBUMIN/GLOB SERPL: 1.4 {RATIO} (ref 1–3)
ALP SERPL-CCNC: 70 U/L (ref 34–104)
ALT SERPL W P-5'-P-CCNC: 16 U/L (ref 7–52)
ANION GAP SERPL CALC-SCNC: 10 MMOL/L (ref 2–11)
APTT PPP: 38.4 SECONDS (ref 26–38)
AST SERPL-CCNC: 20 U/L (ref 13–39)
BASOPHILS # BLD AUTO: 0.1 10^3/UL (ref 0–0.2)
BUN SERPL-MCNC: 10 MG/DL (ref 6–24)
BUN/CREAT SERPL: 10.9 (ref 8–20)
CALCIUM SERPL-MCNC: 9.6 MG/DL (ref 8.6–10.3)
CHLORIDE SERPL-SCNC: 111 MMOL/L (ref 101–111)
EOSINOPHIL # BLD AUTO: 0.3 10^3/UL (ref 0–0.6)
GLOBULIN SER CALC-MCNC: 3 G/DL (ref 2–4)
GLUCOSE SERPL-MCNC: 114 MG/DL (ref 70–100)
HCO3 SERPL-SCNC: 21 MMOL/L (ref 22–32)
HCT VFR BLD AUTO: 39 % (ref 35–47)
HGB BLD-MCNC: 12.9 G/DL (ref 12–16)
INR PPP/BLD: 0.86 (ref 0.82–1.09)
LYMPHOCYTES # BLD AUTO: 2.1 10^3/UL (ref 1–4.8)
MCH RBC QN AUTO: 30 PG (ref 27–31)
MCHC RBC AUTO-ENTMCNC: 33 G/DL (ref 31–36)
MCV RBC AUTO: 90 FL (ref 80–97)
MONOCYTES # BLD AUTO: 0.7 10^3/UL (ref 0–0.8)
NEUTROPHILS # BLD AUTO: 8.2 10^3/UL (ref 1.5–7.7)
NRBC # BLD AUTO: 0 10^3/UL
NRBC BLD QL AUTO: 0
PLATELET # BLD AUTO: 336 10^3/UL (ref 150–450)
POTASSIUM SERPL-SCNC: 3.8 MMOL/L (ref 3.5–5)
PROT SERPL-MCNC: 7.2 G/DL (ref 6.4–8.9)
RBC # BLD AUTO: 4.32 10^6 /UL (ref 3.7–4.87)
RBC UR QL AUTO: (no result)
SODIUM SERPL-SCNC: 142 MMOL/L (ref 135–145)
WBC # BLD AUTO: 11.3 10^3/UL (ref 3.5–10.8)
WBC UR QL AUTO: (no result)

## 2019-08-12 PROCEDURE — 85730 THROMBOPLASTIN TIME PARTIAL: CPT

## 2019-08-12 PROCEDURE — 36415 COLL VENOUS BLD VENIPUNCTURE: CPT

## 2019-08-12 PROCEDURE — 96374 THER/PROPH/DIAG INJ IV PUSH: CPT

## 2019-08-12 PROCEDURE — 86140 C-REACTIVE PROTEIN: CPT

## 2019-08-12 PROCEDURE — 74176 CT ABD & PELVIS W/O CONTRAST: CPT

## 2019-08-12 PROCEDURE — G0463 HOSPITAL OUTPT CLINIC VISIT: HCPCS

## 2019-08-12 PROCEDURE — 83690 ASSAY OF LIPASE: CPT

## 2019-08-12 PROCEDURE — 99283 EMERGENCY DEPT VISIT LOW MDM: CPT

## 2019-08-12 PROCEDURE — 96375 TX/PRO/DX INJ NEW DRUG ADDON: CPT

## 2019-08-12 PROCEDURE — 83605 ASSAY OF LACTIC ACID: CPT

## 2019-08-12 PROCEDURE — 81003 URINALYSIS AUTO W/O SCOPE: CPT

## 2019-08-12 PROCEDURE — 87086 URINE CULTURE/COLONY COUNT: CPT

## 2019-08-12 PROCEDURE — 80053 COMPREHEN METABOLIC PANEL: CPT

## 2019-08-12 PROCEDURE — 85610 PROTHROMBIN TIME: CPT

## 2019-08-12 PROCEDURE — 85025 COMPLETE CBC W/AUTO DIFF WBC: CPT

## 2019-08-12 PROCEDURE — 99212 OFFICE O/P EST SF 10 MIN: CPT

## 2019-08-12 PROCEDURE — 81015 MICROSCOPIC EXAM OF URINE: CPT

## 2019-08-12 NOTE — ED
Abdominal Pain/Female





- HPI Summary


HPI Summary: 





Pt is a 55 y/o F presenting to the ED with a chief complaint of R lower abd 

pain that radiates to her back. She states the pain became severe and constant 

at 0300, and she rates it at 15/10. She reports vomiting. She denies fevers, 

chills, or hematuria.





- History of Current Complaint


Chief Complaint: EDFlankPain


Stated Complaint: POSSIBLE KIDNEY STONE


Time Seen by Provider: 08/12/19 08:00


Hx Obtained From: Patient


Onset/Duration: Gradual Onset, Lasting Hours, Still Present


Timing: Hours


Severity Initially: Moderate


Severity Currently: Severe


Pain Intensity: 10


Pain Scale Used: 0-10 Numeric


Location: Discrete At: RLQ


Radiates: Yes


Radiates to: Back


Aggravating Factor(s): Nothing


Alleviating Factor(s): Nothing


Associated Signs and Symptoms: Positive: Vomiting.  Negative: Fever, Urinary 

Symptoms


Allergies/Adverse Reactions: 


 Allergies











Allergy/AdvReac Type Severity Reaction Status Date / Time


 


Sulfa (Sulfonamide Allergy Severe Unknown Verified 08/12/19 07:39





Antibiotics)   Reaction  





   Details  


 


iodine Allergy  Unknown Verified 08/12/19 07:39





   Reaction  





   Details  


 


iodixanol [From Visipaque] Allergy  Unknown Verified 04/24/19 15:51





   Reaction  





   Details  














PMH/Surg Hx/FS Hx/Imm Hx


Previously Healthy: Yes


Endocrine/Hematology History: 


   Denies: Hx Diabetes, Hx Thyroid Disease


Cardiovascular History: Reports: Hx Hypertension


   Denies: Hx Pacemaker/ICD


Respiratory History: Reports: Hx Seasonal Allergies


   Denies: Hx Asthma, Hx Chronic Obstructive Pulmonary Disease (COPD)


GI History: Reports: Hx Gall Bladder Disease - gall bladder removed ani 2008


   Denies: Hx Gastroesophageal Reflux Disease, Hx Ulcer


 History: 


   Denies: Hx Kidney Stones, Hx Renal Disease


Musculoskeletal History: Reports: Hx Arthritis - back and thumb


Sensory History: Reports: Hx Contacts or Glasses


   Denies: Hx Hearing Aid


Opthamlomology History: Reports: Hx Contacts or Glasses


Neurological History: Reports: Hx Migraine, Other Neuro Impairments/Disorders - 

vertigo


Psychiatric History: Reports: Hx Anxiety, Hx Depression, Hx Panic Disorder - 

PTSD/ANXIETY, Hx Post Traumatic Stress Disorder





- Cancer History


Hx Chemotherapy: No


Hx Radiation Therapy: No





- Surgical History


Surgery Procedure, Year, and Place: GALLBLADDER ,CSECTION 5607-8726,HYSTERECTOMY

,TUBAL; ear tubes; tonsilectomy


Hx Anesthesia Reactions: No





- Immunization History


Date of Tetanus Vaccine: pt states unsure


Date of Influenza Vaccine: none


Infectious Disease History: No


Infectious Disease History: 


   Denies: Hx Clostridium Difficile, Hx Hepatitis, Hx Human Immunodeficiency 

Virus (HIV), Hx of Known/Suspected MRSA, Hx Shingles, Hx Tuberculosis, Hx Known/

Suspected VRE, Hx Known/Suspected VRSA, History Other Infectious Disease, 

Traveled Outside the [place] in Last 30 Days





- Family History


Known Family History: Positive: Hypertension, Diabetes, Other - CA





- Social History


Alcohol Use: Occasionally


Hx Substance Use: No


Substance Use Type: Reports: None


Hx Tobacco Use: No


Smoking Status (MU): Never Smoked Tobacco


Have You Smoked in the Last Year: No





Review of Systems


Negative: Fever, Chills


Positive: Abdominal Pain, Vomiting


Negative: hematuria


Positive: Myalgia - back pain


All Other Systems Reviewed And Are Negative: Yes





Physical Exam





- Summary


Physical Exam Summary: 





GENERAL: Patient is a well-developed and nourished F who is lying comfortable 

in the stretcher. Patient is not in any acute respiratory distress.


HEAD AND FACE: Normocephalic


EYES: PERRLA, EOMI x 2.


EARS: Hearing grossly intact.


MOUTH: Oropharynx within normal limits.


NECK: Supple, trachea is midline, no adenopathy, no JVD, no carotid bruit.


CHEST: Symmetric, no tenderness at palpation


LUNGS: Clear to auscultation bilaterally. No wheezing or crackles.


CVS: Regular rate and rhythm, S1 and S2 present, no murmurs or gallops 

appreciated.


ABDOMEN: Minimal tenderness to palpation in the R lower abd. Bowel sounds are 

normal. No abnormal abdominal pulsations.


EXTREMITIES: Full ROM in all major joints, no edema, no cyanosis or clubbing.


NEURO: Alert and oriented x 3. No acute neurological deficits. Speech is normal 

and follows commands.


SKIN: Dry and warm





Triage Information Reviewed: Yes


Vital Signs On Initial Exam: 


 Initial Vitals











Temp Pulse Resp BP Pulse Ox


 


 96.1 F   105   22   203/119   98 


 


 08/12/19 07:56  08/12/19 07:56  08/12/19 07:56  08/12/19 07:56  08/12/19 07:56











Vital Signs Reviewed: Yes





Diagnostics





- Vital Signs


 Vital Signs











  Temp Pulse Resp BP Pulse Ox


 


 08/12/19 07:56  96.1 F  105  22  203/119  98














- Laboratory


Result Diagrams: 


 08/12/19 08:19





 08/12/19 08:19


Lab Statement: Any lab studies that have been ordered have been reviewed, and 

results considered in the medical decision making process.





- CT


  ** CT a/p


CT Interpretation Completed By: Radiologist


Summary of CT Findings: 1. Moderate RIGHT hydroureteronephrosis. Punctate 1 mm 

stone in the dependent portion of the urinary bladder may reflect a recently 

passed stone or a stone at the far distal margin of the ureteral vesicular 

junction.  2. Follow-up pancreatic mass protocol CT suggested to further 

characterize potential mass lesion at the head of the pancreas.  ED physician 

has reviewed this report.





Abdominal Pain Fem Course/Dx





- Course


Course Of Treatment: Pt is a 55 y/o F presenting to the ED with a chief 

complaint of constant R lower abd pain that radiates to her back, rated at 15/

10. She reports vomiting and denies fevers, chills, and hematuria.  Her 

physical exam is nml aside from minimal tenderness in the R lower abd.  Pts 

lab results show WBC of 11.3, APTT of 38.4, and CO2 of 21. Her UA shows 2+ blood

, 3+ RBC, and present squamous epithelial cells.  In the ED course, the pt was 

given Zofran, Toradol, Morphine, and fluids.  CT a/p shows:  1. Moderate RIGHT 

hydroureteronephrosis. Punctate 1 mm stone in the dependent portion of the 

urinary bladder may reflect a recently passed stone or a stone at the far 

distal margin of the ureteral vesicular junction.  2. Follow-up pancreatic mass 

protocol CT suggested to further characterize potential mass lesion at the head 

of the pancreas.  I spoke with Dr. Peck about the pt's present illness. I 

explained that the next step would be to get a mass protocol CT with contrast, 

but the patient is allergic to iodine. We discussed that the other option would 

be to get an MRI done, but this can be done urgently, and ordered by her PCP. I 

discussed this plan with the patient who agrees to follow up with urology about 

her kidney stones as well as her PCP to get an MRI done to further evaluate her 

pancreatic lesion.  Her dx will include kidney stones and pancreatic lesion. 

She is hemodynamically stable and safe for discharge. Strict return precautions 

given.





- Diagnoses


Provider Diagnoses: 


 Kidney stones, Pancreatic lesion








Discharge





- Sign-Out/Discharge


Documenting (check all that apply): Patient Departure


Patient Received Moderate/Deep Sedation with Procedure: No





- Discharge Plan


Condition: Stable


Disposition: HOME


Patient Education Materials:  Kidney Stones (ED)


Forms:  *Work Release


Referrals: 


Nancy Mullins MD [Primary Care Provider] - 


Julián Garcia MD [Medical Doctor] - 


Additional Instructions: 


Please follow up with your primary care provider. You need to have an MRI done 

mass protocol to be ordered by your primary care provider to evaluate possible 

lesions on your pancreas.





Please also follow up with Dr. Garcia of urology about your kidney stones.





Return to the emergency department with any new or worsening symptoms.








- Billing Disposition and Condition


Condition: STABLE


Disposition: Home





- Attestation Statements


Document Initiated by Scribe: Yes


Documenting Scribe: Tia Holland


Provider For Whom Eugenia is Documenting (Include Credential): Den Miner MD.


Scribe Attestation: 


Tia JENSEN, scribed for Den Miner MD. on 08/12/19 at 1246. 


Scribe Documentation Reviewed: Yes


Provider Attestation: 


The documentation as recorded by the scribe, Tia Holland accurately 

reflects the service I personally performed and the decisions made by me, Jeremias Miner MD.


Status of Scribe Document: Viewed





Consult


Consult: 





[id number] - I spoke with Dr. Peck about the pt's present illness. I explained that 

the next step would be to get a mass protocol CT with contrast, but the patient 

is allergic to iodine. We discussed that the other option would be to get an 

MRI done, but this can be done urgently, and ordered by her PCP.

## 2019-08-12 NOTE — UC
Abdominal Pain Female HPI





- HPI Summary


HPI Summary: 


54-year-old woman comes in with a chief complaint of right-sided abdominal and 

flank pain.  Started 3 this morning.  Pain is severe.  Patient is nauseous and 

she's been vomiting.  She tried a bowel movement but she couldn't.  She is 

unable to make the pain worse or better.  She is diaphoretic.





- History of Current Complaint


Stated Complaint: ABD/BACK PAIN


Time Seen by Provider: 08/12/19 07:34


Allergies/Adverse Reactions: 


 Allergies











Allergy/AdvReac Type Severity Reaction Status Date / Time


 


Sulfa (Sulfonamide Allergy Severe Unknown Verified 08/12/19 07:39





Antibiotics)   Reaction  





   Details  


 


iodine Allergy  Unknown Verified 08/12/19 07:39





   Reaction  





   Details  


 


iodixanol [From Visipaque] Allergy  Unknown Verified 04/24/19 15:51





   Reaction  





   Details  














PMH/Surg Hx/FS Hx/Imm Hx


Previously Healthy: Yes


Cardiovascular History: Hypertension





- Surgical History


Surgical History: Yes


Surgery Procedure, Year, and Place: GALLBLADDER ,CSECTION 5391-1710,HYSTERECTOMY

,TUBAL; ear tubes; tonsilectomy





- Family History


Known Family History: Positive: Hypertension, Diabetes, Other - CA





- Social History


Alcohol Use: Occasionally


Substance Use Type: None


Smoking Status (MU): Never Smoked Tobacco


Have You Smoked in the Last Year: No





Review of Systems


All Other Systems Reviewed And Are Negative: Yes


Constitutional: Positive: Other - see hpi


Skin: Positive: Negative


Eyes: Positive: Negative


ENT: Positive: Negative


Respiratory: Positive: Negative


Cardiovascular: Positive: Negative


Gastrointestinal: Positive: Abdominal Pain, Vomiting, Nausea


Motor: Positive: Negative


Neurovascular: Positive: Negative


Musculoskeletal: Positive: Negative


Neurological: Positive: Negative


Psychological: Positive: Negative


Is Patient Immunocompromised?: No





Physical Exam


Triage Information Reviewed: Yes


Appearance: Well-Nourished, Ill-Appearing - mild, Pain Distress - moderate/

severe


Vital Signs Reviewed: Yes


Eye Exam: Normal


Eyes: Positive: Conjunctiva Clear


Neck: Positive: Supple


Respiratory: Positive: Lungs clear, Normal breath sounds, No respiratory 

distress


Abdomen Description: Positive: Other: - tender rt


Bowel Sounds: Positive: Present


Musculoskeletal: Positive: Strength Intact, ROM Intact


Neurological: Positive: Alert, Muscle Tone Normal


Psychological: Positive: Age Appropriate Behavior


Skin Exam: Normal





Abd Pain Female Course/Dx





- Course


Course Of Treatment: 


Due to the severity of the pain I recommended transport to the emergency 

department by ambulance.  The patient declined transfer by ambulance and her 

sister is here who will take her by POV.





- Differential Dx/Diagnosis


Provider Diagnosis: 


 Right sided abdominal pain, Right flank pain








Discharge





- Sign-Out/Discharge


Documenting (check all that apply): Patient Departure


All imaging exams completed and their final reports reviewed: No Studies





- Discharge Plan


Condition: Stable


Disposition: HOME-RECOMMEND TO ED


Referrals: 


Nancy Mullins MD [Primary Care Provider] - 





- Billing Disposition and Condition


Condition: STABLE


Disposition: Home-Recommend to ED

## 2019-08-12 NOTE — XMS REPORT
Continuity of Care Document (CCD)

 Created on:2019



Patient:Sergio Alva

Sex:Female

:1965

External Reference #:[id number]





Demographics







 Address  [address] [place] [address]

 

 Mobile Phone  [phone]

 

 Work Phone  [phone]

 

 Preferred Language  en

 

 Marital Status  Not  or 

 

 Rastafari Affiliation  Unknown

 

 Race  White

 

 Ethnic Group  Not  or 









Author







 Name  Mary Monson









Support







 Name  Relationship  Address  Phone

 

 Miya Bird  Unavailable  Unavailable  [phone]









Care Team Providers







 Name  Role  Phone

 

 Nancy Mullins MD  Primary Care Physician  Unavailable









Payers







 Date  Identification Numbers  Payment Provider  Subscriber

 

   Policy Number: [id number]  [facility]  Sergio Alva









 Group Number: [id number]  [address] [address]

 

 PayID: [id number]  [place] [address]









 Expires: 2016  Policy Number: [id number]  [facility] JOSE Alva









 PayID: [id number]  [address] [address]









 [place] [address]







Family History







 Date  Family Member(s)  Observation  Comments

 

   General  Chronic Obstructive Pulmonary Disease (COPD)  

 

   General  Cancer  

 

   Mother   due to Lung Cancer  ()

 

   Siblings  1  







Social History







 Type  Date  Description  Comments

 

 Birth Sex    Unknown  

 

 Marital Status      

 

 Lives With    Son  

 

 Occupation    Build medical equipment  

 

 Tobacco Use  Start: Unknown  Never Smoked Cigarettes  

 

 Smoking Status  Reviewed: 19  Never Smoked Cigarettes  

 

 ETOH Use    Rarely consumes alcohol  

 

 Recreational Drug Use    Never Used Drugs  

 

 Tobacco Use  Start: Unknown  Patient has never smoked  

 

 Exercise Type/Frequency    Does not exercise  







Allergies, Adverse Reactions, Alerts







 Active Allergies  Reaction  Severity  Comments  Date

 

 Sulfa        2019

 

 IV Contrast        2019







Medications







 Active Medications  SIG  Qnty  Indications  Ordering Provider  Date

 

 Fluoxetine HCL  Take One Capsule      Unknown  



             20mg  By Mouth Twice A        



 Capsules  Day        



           

 

 Montelukast Sodium  Take One Tablet By      Unknown  



                 10mg  Mouth One Time        



 Tablets  Daily        



           

 

 Propranolol HCL  Take One Tablet By      Unknown  



              40mg  Mouth Every Day        



 Tablets          



           

 

 Desloratadine  Take One Tablet By      Unknown  



            5mg Tablets  Mouth Every Day        



           

 

 Loratadine  once a day for      Unknown  



         10mg Capsules  allergies as        



   needed        

 

 Lorazepam  1 tab upto 3x a      Unknown  



        1mg Tablets  day        



           

 

 Black Cohosh  540mg 1x day      Unknown  







Vital Signs







 Date  Vital  Result  Comment

 

 2019  2:13pm  Height  70 inches  5'10"









 Heart Rate  82 /min  

 

 BP Systolic  126 mmHg  

 

 BP Diastolic  82 mmHg  

 

 Respiratory Rate  18 /min  

 

 O2 % BldC Oximetry  94 %  









 2019  7:37am  Height  70 inches  5'10"









 Heart Rate  94 /min  

 

 BP Systolic Sitting  124 mmHg  Lue large cuff

 

 BP Diastolic Sitting  82 mmHg  Lue large cuff

 

 Respiratory Rate  16 /min  

 

 O2 % BldC Oximetry  97 %  

 

 Neck Circumference in inches  15.75  







Results







 Test  Date  Facility  Test  Result  H/L  Range  Note

 

 Laboratory test  2019  [facility]  Surgical  SEE RESULT      1



 finding    [address]  Interface Order  BELOW      



     [place] [address]          



     [phone]          









 1  SEE RESULT BELOW



   -----------------------------------------------------------------------------
---------------



   Name:  SERGIO ALVA HERLINDA                  : 1965    Attend Dr: Toni Berg DO



   Acct:  [id number]  Unit: [id number]  AGE: 54            Location:  ENDO



   Re19                        SEX: F             Status:    DEP REF



   -----------------------------------------------------------------------------
---------------



   



   SPEC: [id number]             LEONOR:       SUBM DR: Toni Berg DO



   REQ:  [id number]             RECD: [id number]



   STATUS: EMMA ARGUETA DR: Nancy Ferguson MD



   _



   ORDERED:  LEVEL 4



   



   FINAL DIAGNOSIS



   



   



   Distal esophagus, biopsy:



   -- Gastroesophageal transition zone mucosa with inflamed polypoid fragment 
of gastric



   Cardia-type mucosa with extensive goblet cell/intestinal metaplasia.



   -- Mild reflux esophagitis noted (up to 5 eosinophil/HPF).



   -- No dysplasia identified.



   



   



   -----------------------------------------------------------------------------
---------------



   



   



   



   CLINICAL HISTORY



   



   Obesity; Zuñiga???s esophagus



   



   



   POST-OPERATIVE DIAGNOSIS



   



   EGD: esophagus - Zuñiga???s esophagus; 5 cm hiatal hernia 39 - 44 cm; 
gastric - normal,



   biopsy, ULYSSES test; duodenum - normal



   



   



   GROSS DESCRIPTION



   



   The specimen is received in formalin labeled, Distal Esophagus Biopsy, and 
consists of a 0.4



   x 0.3 x 0.1 cm pink-white irregular soft tissue fragment which is submitted 
entirely in one



   cassette.



   



   Signed by and Reported on: __________              Khoa Zeng MD  [id number]



   



   -----------------------------------------------------------------------------
---------------



   



   



   ** END OF REPORT **



   



   DEPARTMENT OF PATHOLOGY,  [address] [place] [address]



   Phone # [phone]      Fax #[phone]



   Khoa Zegn M.D. Director     [facility] # [id number]







Procedures







 Date  Code  Description  Status

 

 2019  88618  Sleep Study Unattended,HRT Rate,Oxygen Sat,Resp  Completed



     Effort/Airflow  

 

 2008  95343  EKG, Interpretation Only  Completed







Encounters







 Type  Date  Location  Provider  Dx  Diagnosis

 

 Office Visit  2019  [facility]  Tammy Hastings MD  R06.83  
Snoring



   8:00a  [facility] [address]      









 R53.83  Other fatigue

 

 E66.9  Obesity, unspecified







Plan of Treatment

Future Appointment(s):2019  3:00 pm - Ольга Oneil NP at 
[facility] [address]2019 - Ольга Oneil [id number] 
Obstructive sleep apnea (adult) (pediatric)New Orders:Sleep-Homecare, Ordered: 
19Follow up:6 weeksRecommendations:You are being set up with CPAP for 
your sleep apnea through [facility] [phone]. They will call you to 
set up an appointment to get fit for a mask and  your machine.     If 
you have difficulty with your equipment, or need to replace your mask or hoses, 
please contact your homecare agency.   If you have any further questions, 
please call the [facility] at [phone]  Ifyou have any 
sleepiness while driving you MUST avoid operating a vehicle or machinery.E66.9 
Obesity, unspecifiedRecommendations:Keep up the good work with your weight loss 
efforts!  It is important to bring your CPAP with you [facility] when you 
go for your surgery so you can use it when you sleep.  In general, using CPAP 
will help you feel more well rested, which will help with your weight loss. 
When you are less tired, you will have more energy to exercise and make good 
food choices.  Many people are able to lessen theseverity of their sleep apnea 
or resolve it entirely with weight loss. Once you are at your goal weight, we 
can re-test you and see if you still have sleep apnea. You should continue 
using your CPAP until you have a test that shows your sleep apnea has 
resolved.R53.83 Other fatigue

## 2019-11-17 ENCOUNTER — HOSPITAL ENCOUNTER (EMERGENCY)
Dept: HOSPITAL 25 - UCCORT | Age: 54
Discharge: HOME | End: 2019-11-17
Payer: COMMERCIAL

## 2019-11-17 VITALS — DIASTOLIC BLOOD PRESSURE: 89 MMHG | SYSTOLIC BLOOD PRESSURE: 158 MMHG

## 2019-11-17 DIAGNOSIS — R53.83: ICD-10-CM

## 2019-11-17 DIAGNOSIS — R51: ICD-10-CM

## 2019-11-17 DIAGNOSIS — Z88.8: ICD-10-CM

## 2019-11-17 DIAGNOSIS — R19.7: ICD-10-CM

## 2019-11-17 DIAGNOSIS — R11.10: ICD-10-CM

## 2019-11-17 DIAGNOSIS — J06.9: ICD-10-CM

## 2019-11-17 DIAGNOSIS — R09.81: ICD-10-CM

## 2019-11-17 DIAGNOSIS — Z88.2: ICD-10-CM

## 2019-11-17 DIAGNOSIS — F41.9: ICD-10-CM

## 2019-11-17 DIAGNOSIS — J02.9: ICD-10-CM

## 2019-11-17 DIAGNOSIS — J44.9: Primary | ICD-10-CM

## 2019-11-17 DIAGNOSIS — Z79.899: ICD-10-CM

## 2019-11-17 PROCEDURE — G0463 HOSPITAL OUTPT CLINIC VISIT: HCPCS

## 2019-11-17 PROCEDURE — 99212 OFFICE O/P EST SF 10 MIN: CPT

## 2019-11-17 NOTE — UC
Respiratory Complaint HPI





- HPI Summary


HPI Summary: 





Pt presents with c/o cough, sob, wheezing, HA, fever, chills, nausea, vomiting, 

diarrhea X 8 days. 





- History of Current Complaint


Chief Complaint: UCRespiratory


Stated Complaint: ST, COUGH, FEVER, VOMITTING, DIARRHEA


Time Seen by Provider: 11/17/19 10:03


Hx Obtained From: Patient


Pregnant?: No


Onset/Duration: Gradual Onset, Lasting Days, Worse Since - onset


Timing: Constant


Severity Initially: Mild


Severity Currently: Severe


Pain Intensity: 10


Character: Cough: Productive


Aggravating Factors: Exertion, Deep Breaths, Recumbent Position


Alleviating Factors: Nothing


Associated Signs And Symptoms: Positive: Fever, Chills, Wheezing, URI, Nasal 

Congestion





- Risk Factors


Pulmonary Embolism Risk Factors: Negative


Cardiac Risk Factors: Negative


Pseudomonas Risk Factors: Negative


Tuberculosis Risk Factors: Negative





- Allergies/Home Medications


Allergies/Adverse Reactions: 


 Allergies











Allergy/AdvReac Type Severity Reaction Status Date / Time


 


Sulfa (Sulfonamide Allergy Severe Unknown Verified 11/17/19 09:29





Antibiotics)   Reaction  





   Details  


 


iodine Allergy  Unknown Verified 11/17/19 09:29





   Reaction  





   Details  


 


iodixanol [From Visipaque] Allergy  Unknown Verified 11/17/19 09:29





   Reaction  





   Details  











Home Medications: 


 Home Medications





Black Cohosh 40 mg PO DAILY 11/17/19 [History Confirmed 11/17/19]


Buspirone HCl 15 mg PO BID 11/17/19 [History Confirmed 11/17/19]


Pantoprazole TAB * [Protonix TAB*] 40 mg PO DAILY 11/17/19 [History Confirmed 11 /17/19]











PMH/Surg Hx/FS Hx/Imm Hx


Previously Healthy: Yes


Respiratory History: COPD, Asthma


Psychological History: Anxiety





- Surgical History


Surgical History: Yes


Surgery Procedure, Year, and Place: GALLBLADDER ,CSECTION [id number],HYSTERECTOMY

,TUBAL; ear tubes; tonsilectomy





- Family History


Known Family History: Positive: Hypertension, Diabetes, Other - [place]





- Social History


Occupation: Employed Full-time


Lives: With Family


Alcohol Use: Occasionally


Substance Use Type: None


Smoking Status (MU): Never Smoked Tobacco


Have You Smoked in the Last Year: No





Review of Systems


All Other Systems Reviewed And Are Negative: Yes


Constitutional: Positive: Fever, Chills, Fatigue


Skin: Positive: Negative


Eyes: Positive: Negative


Respiratory: Positive: Shortness Of Breath, Cough


Cardiovascular: Positive: Negative


Gastrointestinal: Positive: Negative


Genitourinary: Positive: Negative


Motor: Positive: Negative


Neurovascular: Positive: Negative


Musculoskeletal: Positive: Negative


Neurological: Positive: Headache


Psychological: Positive: Negative


Is Patient Immunocompromised?: No





Physical Exam


Triage Information Reviewed: Yes


Appearance: Ill-Appearing


Vital Signs: 


 Initial Vital Signs











Temp  98 F   11/17/19 09:34


 


Pulse  87   11/17/19 09:34


 


Resp  22   11/17/19 09:34


 


BP  158/89   11/17/19 09:34


 


Pulse Ox  98   11/17/19 09:34











Vital Signs Reviewed: Yes


Eye Exam: Normal


ENT: Positive: Nasal congestion


Dental Exam: Normal


Neck exam: Normal


Respiratory: Positive: Decreased breath sounds, Wheezing


Cardiovascular Exam: Normal


Musculoskeletal Exam: Normal


Neurological Exam: Normal


Psychological Exam: Normal


Skin Exam: Normal





Respiratory Course/Dx





- Course


Course Of Treatment: 





I discussed viral vs bacterial and pt has pmh of asthma but does not have 

rescue inhaler. I discussed pt's BP with her and recommended that she f/u with 

PCP





- Differential Dx/Diagnosis


Differential Diagnosis/HQI/PQRI: Bronchitis, Pulmonary Embolism


Provider Diagnosis: 


 Bronchitis








Discharge ED





- Sign-Out/Discharge


Documenting (check all that apply): Patient Departure


All imaging exams completed and their final reports reviewed: No Studies





- Discharge Plan


Condition: Stable


Disposition: HOME


Prescriptions: 


Albuterol HFA INHALER* [Ventolin HFA Inhaler*] 2 puff INH Q4H PRN #1 mdi


 PRN Reason: Sob/Wheezing


Benzonatate CAP* [Tessalon 100 MG CAP*] 100 mg PO Q8H PRN #30 cap


 PRN Reason: Cough


DOXYcycline CAP(*) [DOXYcycline 100MG CAP(*)] 100 mg PO Q12H #20 cap


Ondansetron TAB* [Zofran 4 MG Tab*] 4 mg PO Q6H PRN #12 tab


 PRN Reason: Nausea


predniSONE TAB* [Deltasone 20 MG TAB*] 60 mg PO DAILY #12 tab


Patient Education Materials:  Loperamide (By mouth), Acute Bronchitis (ED), 

Acute Nausea and Vomiting (ED), Wheezing (ED)


Referrals: 


Nancy Mullins MD [Primary Care Provider] - If Needed





- Billing Disposition and Condition


Condition: STABLE


Disposition: Home

## 2019-11-17 NOTE — XMS REPORT
Continuity of Care Document (CCD)

 Created on:2019



Patient:Sergio Alva

Sex:Female

:1965

External Reference #:[id number]





Demographics







 Address  [address] [place] [address]

 

 Mobile Phone  [phone]

 

 Work Phone  [phone]

 

 Preferred Language  en

 

 Marital Status  Declined to Specify/Unknown

 

 Cheondoism Affiliation  Unknown

 

 Race  White

 

 Ethnic Group  Declined to Specify/Unknown









Author







 Name  Nancy Mullins M.D.

 

 Address  [address] [place] [id number]









Problems







 Active Problems  Provider  Date

 

 Dyspnea  Nancy Mullins M.D.  Onset: 2012

 

 Acute exacerbation of chronic obstructive  Nancy Mullins M.D.  Onset: 2012



 airways disease    

 

 Malaise and fatigue  Nancy Mullins M.D.  Onset: 2012

 

 Anxiety state  Nancy Mullins M.D.  Onset: 2012

 

 Benign essential hypertension  Justo Munguia FNASIYA  Onset: 2012

 

 Overweight  Justo Munguia FNASIYA  Onset: 2012

 

 Morbid obesity  Justo Munguia FNASIYA  Onset: 2012

 

 Depressive disorder  Justo Munguia FNASIYA  Onset: 2012

 

 Degeneration of lumbar intervertebral disc  Justo Munguia FNASIYA  Onset: 2012

 

 Low back pain  MANJEET Elmore  Onset: 2012

 

 Myalgia & Myositis Unspecified  MANJEET Elmore  Onset: 2012

 

 Peptic reflux disease  Nancy Mullins M.D.  Onset: 2012

 

 Asthma without status asthmaticus  Nancy Mullins M.D.  Onset: 2013

 

 Dysfunction of eustachian tube  Nancy Mullins M.D.  Onset: 2013

 

 Type II diabetes mellitus uncontrolled  Nancy Mullins M.D.  Onset: 2013

 

 Osteoarthritis  Nancy Mullins M.D.  Onset: 2013

 

 Allergic rhinitis  Nancy Mullins M.D.  Onset: 2013

 

 Indigestion  Nancy Mullins M.D.  Onset: 2013

 

 Mixed hyperlipidemia  Nancy Mullins M.D.  Onset: 2013

 

 Hypothyroidism  Nancy Mullins M.D.  Onset: 2014

 

 Posttraumatic stress disorder  Nancy Mullins M.D.  Onset: 2014

 

 Insomnia  Nancy Mullins M.D.  Onset: 2015

 

 Palpitations  Nancy Mullins M.D.  Onset: 2015

 

 Essential hypertension  Justo Munguia FNP  Onset: 2015

 

 Degeneration of lumbosacral intervertebral  Nancy Mullins M.D.  Onset: 



 disc    

 

 Other specified disorders of Eustachian tube,  Nancy Mullins M.D.  Onset: 



 bilateral    







Social History







 Type  Date  Description  Comments

 

 Birth Sex    Unknown  

 

 Tobacco Use  Start: Unknown  Never Smoked Cigarettes  

 

 ETOH Use    Rarely consumes alcohol  

 

 Tobacco Use  Start: Unknown  Patient has never smoked  







Allergies, Adverse Reactions, Alerts







 Active Allergies  Reaction  Severity  Comments  Date

 

 Sulfa Antibiotics        2012







Medications







 Active Medications  SIG  Qnty  Indications  Ordering  Date



         Provider  

 

 Pantoprazole Sodium  1 by mouth every  90tabs  K21.0  Nancy Mullins,  10/04/
2019



                  40mg  day      M.D.  



 Tablets DR Willsonatadine  1 tab by mouth  90tabs  J30.9  Nancy Mullins,  10/04/2019



         10mg Tablets  every day      M.D.  



           

 

 Triamcinolone  apply to affected  15gm  S50.862A  Nancy Mullins,  10/04/2019



 Acetonide  area twice a day      M.D.  



        0.5% Cream  as needed        



           









 L20.9









 Buspirone HCL  1 tab by mouth  180tabs  F41.9  Nancy Mullins,  2019



            15mg  twice a day      M.D.  



 Tablets          



           

 

 Lorazepam  1/2-1 tab by mouth  60tabs  F43.12  Nancy Mullins,  2019



        1mg Tablets  three times a day      M.D.  



   as needed        









 G47.00

 

 F41.9









 Hydroxyzine HCL  take 1 to 2 tablets  180tabs  H66.93  Nancy Mullins,  2019



             25mg  every night as      M.D.  



 Tablets  needed        



           

 

 Vitamin D3  1 cap by mouth  12caps  E55.9  HarpreetNancy العراقي,  2019



        [id number]  every week      M.D.  



 Capsules          



           

 

 Fluoxetine HCL  1 tab by mouth  180caps  F41.9  HarpreetNancy العراقي,  2018



            20mg  twice a day every      M.D.  



 Capsules  evening        



           









 F33.9









 Lidoderm  apply 1 patch to  90units  M25.511  Nancy Mullins,  10/12/2017



         5% Patches  affected arae every      M.D.  



   day        









 M54.5









 Epipen 2-Laz  inject as directed  1units  Z91.030  Nancy Mullins,  08/10/2016



          0.3mg/0.3ML        M.D.  



 Solution Auto-Inject          



           

 

 Propranolol HCL  1 tab by mouth  90tabs  I10  Nancy Mullins,  2015



             40mg  every day      M.D.  



 Tablets          



           

 

 Singulair  take one tablet by  90tabs  J45.909  Nancy Mullins,  2012



       10mg Tablets  mouth one time      M.D.  



   daily        









 J30.89

 

 History Medications









 Amoxicillin/Clavulanate  1 tab by  30tabs  H66.93  Nancy Mullins  2019 -



 Potassium  mouth twice      M., M.D.  2019



 875-125mg Tablets  a day        



           

 

 Diflucan  1 by mouth  14tabs  H66.93  HarpreetNancy العراقي  2019 -



 100mg Tablets  every day x      M., M.D.  2019



   1 week then        



   skip week        



   then        



   restart.        







Medications Administered in Office







 Medication  SIG  Qnty  Indications  Ordering Provider  Date

 

 Depo Provera Injection        MANJEET Elmore  2004



             Injection          



           

 

 Injection        Nancy Mullins M.D.  2004



 Therapeutic/Prophylactic/Diagos          



 tic          



 Injection          







Immunizations







 CPT Code  Status  Date  Vaccine  Lot #

 

 47980  Given  2015  TDaP  [id number]

 

 19853  Given  2013  Flu Vaccine  [id number]

 

 93877  Given  1997  [facility]  







Vital Signs







 Date  Vital  Result  Comment

 

 10/04/2019  9:37am  BP Systolic  118 mmHg  









 BP Diastolic  66 mmHg  

 

 Height  68 inches  5'8"

 

 Heart Rate  82 /min  

 

 Respiratory Rate  16 /min  









 2019 10:18am  BP Systolic  136 mmHg  









 BP Diastolic  98 mmHg  

 

 Height  68 inches  5'8"

 

 Weight  285.00 lb  

 

 BMI (Body Mass Index)  43.3 kg/m2  

 

 Heart Rate  92 /min  

 

 Respiratory Rate  16 /min  







Results







 Test  Date  Facility  Test  Result  H/L  Range  Note

 

 Comp Metabolic Panel  2019  [facility]  Sodium  142 mmol/L  Normal  
135-145  









 Potassium  3.8 mmol/L  Normal  3.5-5.0  

 

 Chloride  111 mmol/L  Normal  101-111  

 

 Co2 Carbon Dioxide  21 mmol/L  Low  22-32  

 

 Anion Gap  10 mmol/L  Normal  2-11  

 

 Glucose  114 mg/dL  High    

 

 Blood Urea Nitrogen  10 mg/dL  Normal  6-24  

 

 Creatinine  0.92 mg/dL  Normal  0.51-0.95  

 

 BUN/Creatinine Ratio  10.9  Normal  8-20  

 

 Calcium  9.6 mg/dL  Normal  8.6-10.3  

 

 Total Protein  7.2 g/dL  Normal  6.4-8.9  

 

 Albumin  4.2 g/dL  Normal  3.2-5.2  

 

 Globulin  3.0 g/dL  Normal  2-4  

 

 Albumin/Globulin Ratio  1.4  Normal  1-3  

 

 Total Bilirubin  0.30 mg/dL  Normal  0.2-1.0  

 

 Alkaline Phosphatase  70 U/L  Normal    

 

 Alt  16 U/L  Normal  7-52  

 

 Ast  20 U/L  Normal  13-39  

 

 Egfr Non-  63.9    >60  

 

 Egfr   77.3    >60  1









 Laboratory test finding  2019  [facility]  Lipase  32 U/L  Normal  
11.0-82.0  









 C Reactive Protein  5.04 mg/L  Normal  <8.01  

 

 Lactic Acid  1.6 mmol/L  Normal  0.5-2.0  2









 CBC Auto Diff  2019  [facility]  White Blood  11.3 10^3/uL  High  3.5
-10.8  



       Count        









 Red Blood Count  4.32 10^6/uL  Normal  3.70-4.87  

 

 Hemoglobin  12.9 g/dL  Normal  12.0-16.0  

 

 Hematocrit  39 %  Normal  35-47  

 

 Mean Corpuscular Volume  90 fL  Normal  80-97  

 

 Mean Corpuscular Hemoglobin  30 pg  Normal  27-31  

 

 Mean Corpuscular HGB Conc  33 g/dL  Normal  31-36  

 

 Red Cell Distribution Width  14 %  Normal  10-15  

 

 Platelet Count  336 10^3/uL  Normal  150-450  

 

 Mean Platelet Volume  7.9 fL  Normal  7.4-10.4  

 

 Abs Neutrophils  8.2 10^3/uL  High  1.5-7.7  

 

 Abs Lymphocytes  2.1 10^3/uL  Normal  1.0-4.8  

 

 Abs Monocytes  0.7 10^3/uL  Normal  0-0.8  

 

 Abs Eosinophils  0.3 10^3/uL  Normal  0-0.6  

 

 Abs Basophils  0.1 10^3/uL  Normal  0-0.2  

 

 Abs Nucleated RBC  0.0 10^3/uL      

 

 Granulocyte %  72.1 %      

 

 Lymphocyte %  18.8 %      

 

 Monocyte %  5.9 %      

 

 Eosinophil %  2.3 %      

 

 Basophil %  0.9 %      

 

 Nucleated Red Blood Cells %  0.0      









 Inr/Protime  2019  [facility]  Inr  0.86  Normal  0.82-1.09  3

 

 Laboratory test  2019  [facility]  Partial  38.4 seconds  High  26.0-
38.0  



 finding      Thrombo Time        



       PTT        

 

 Urinalysis  2019  [facility]  Urine Color  Yellow      



 Profile              









 Urine Appearance  Cloudy      

 

 Urine Specific Gravity  1.020  Normal  1.010-1.030  

 

 Urine pH  5.0  Normal  5-9  

 

 Urine Urobilinogen  Negative    Negative  

 

 Urine Ketones  Negative    Negative  

 

 Urine Protein  Negative    Negative  

 

 Urine Leukocytes  Negative    Negative  

 

 Urine Blood  2+  Abnormal  Negative  

 

 Urine Nitrite  Negative    Negative  

 

 Urine Bilirubin  Negative    Negative  

 

 Urine Glucose  Negative    Negative  

 

 Urine White Blood Cell  Trace(0-5/hpf)    Absent  

 

 Urine Red Blood Cell  3+(>10/hpf)  Abnormal  Absent  

 

 Urine Bacteria  Absent    Absent  

 

 Urine Squamous Epithelial Cell  Present  Abnormal  Absent  









 Urine Culture And  2019  [facility]  Urine Culture  SEE RESULT      4



 Sensitivities        BELOW      

 

 Laboratory test  2019  [facility]  Clotest  SEE RESULT      5



 finding        BELOW      

 

 Laboratory test  2019  [facility]  Surgical  SEE RESULT      6



 finding      Interface Order  BELOW      









 1  *******Because ethnic data is not always readily available,



   this report includes an eGFR for both -Americans and



   non- Americans.****



   The National Kidney Disease Education Program (NKDEP) does



   not endorse the use of the MDRD equation for patients that



   are not between the ages of 18 and 70, are pregnant, have



   extremes of body size, muscle mass, or nutritional status,



   or are non- or non-.



   According to the National Kidney Foundation, irrespective of



   diagnosis, the stage of the disease is based on the level of



   kidney function:



   Stage Description                      GFR(mL/min/1.73 m(2))



   1     Kidney damage with normal or decreased GFR       90



   2     Kidney damage with mild decrease in GFR          60-89



   3     Moderate decrease in GFR                         30-59



   4     Severe decrease in GFR                           15-29



   5     Kidney failure                       <15 (or dialysis)

 

 2  [place] Severe Sepsis and Septic Shock Management Bundle Measure



   requires all lactic acids initially measuring >2.0 mmol/L be



   repeated.

 

 3  Standard intensity warfarin therapeutic range: 2.0-3.0



   High intensity warfarin therapeutic range: 2.5-3.5

 

 4  SEE RESULT BELOW



   -----------------------------------------------------------------------------
---------------



   Name:  SERGIO ALVA                  : 1965    Attend Dr: Jeremias Miner MD



   Acct:  [id number]  Unit: [id number]  AGE: 54            Location:  [facility]



   Re19                        SEX: F             Status:    DEP ER



   -----------------------------------------------------------------------------
---------------



   



   SPEC: [id number]         LEONOR:       SERGIO DR: Den Miner MD



   REQ:  [id number]              RECD:   



   STATUS: COMP             [facility] DR: Nancy Mullins MD



   _



   SOURCE: URINE          SPDESC:



   ORDERED:  Urine Culture



   



   -----------------------------------------------------------------------------
---------------



   Procedure                         Result                         Reported   
        Site



   -----------------------------------------------------------------------------
---------------



   Urine Culture  Final                                             19-
1259      ML



   



   No growth of clinically significant organisms



   



   -----------------------------------------------------------------------------
---------------



   * ML - Main Lab



   .



   



   



   



   



   



   



   



   



   



   



   



   



   



   



   



   



   



   



   



   



   



   



   



   



   



   



   ** END OF REPORT **



   



   DEPARTMENT OF PATHOLOGY,  [address] [place] [address]



   Phone # [phone]      Fax #[phone]



   Khoa Zeng M.D. Director     [facility] # [id number]

 

 5  SEE RESULT BELOW



   -----------------------------------------------------------------------------
---------------



   Name:  SERGIO ALVA                  : 1965    Attend Dr: Toni Berg DO



   Acct:  [id number]  Unit: [id number]  AGE: 54            Location:  ENDO



   Re19                        SEX: F             Status:    REG REF



   -----------------------------------------------------------------------------
---------------



   



   SPEC: [id number]         LEONOR:       [facility] DR: Toni Berg DO



   REQ:  [id number]              RECD:   



   STATUS: INEZ ARGUETA DR: Nancy Mullins MD



   _



   SOURCE: GAS ANTRUM     SPDESC:



   ORDERED:  Clotest



   



   -----------------------------------------------------------------------------
---------------



   Procedure                         Result                         Reported   
        Site



   -----------------------------------------------------------------------------
---------------



   Clotest  Final                                                   19-
909      ML



   Clotest                     Positive



   



   -----------------------------------------------------------------------------
---------------



   * ML - Main Lab



   .



   



   



   



   



   



   



   



   



   



   



   



   



   



   



   



   



   



   



   



   



   



   



   



   



   



   



   



   ** END OF REPORT **



   



   DEPARTMENT OF PATHOLOGY,  [address] [place] [address]



   Phone # [phone]      Fax #[phone]



   Khoa Zeng M.D. Director     [facility] # [id number]

 

 6  SEE RESULT BELOW



   -----------------------------------------------------------------------------
---------------



   Name:  SERGIO ALVA HERLINDA                  : 1965    Attend Dr: Toni Berg DO



   Acct:  [id number]  Unit: [id number]  AGE: 54            Location:  ENDO



   Re19                        SEX: F             Status:    DEP REF



   -----------------------------------------------------------------------------
---------------



   



   SPEC: [id number]             LEONOR:       [facility] DR: Toni Berg DO



   REQ:  [id number]             RECD: 712



   STATUS: EMMA ARGUETA DR: Nancy Ferguson MD



   _



   ORDERED:  LEVEL 4



   



   FINAL DIAGNOSIS



   



   



   Distal esophagus, biopsy:



   -- Gastroesophageal transition zone mucosa with inflamed polypoid fragment 
of gastric



   Cardia-type mucosa with extensive goblet cell/intestinal metaplasia.



   -- Mild reflux esophagitis noted (up to 5 eosinophil/HPF).



   -- No dysplasia identified.



   



   



   -----------------------------------------------------------------------------
---------------



   



   



   



   CLINICAL HISTORY



   



   Obesity; Zuñiga???s esophagus



   



   



   POST-OPERATIVE DIAGNOSIS



   



   EGD: esophagus - Zuñiga???s esophagus; 5 cm hiatal hernia 39 - 44 cm; 
gastric - normal,



   biopsy, ULYSSES test; duodenum - normal



   



   



   GROSS DESCRIPTION



   



   The specimen is received in formalin labeled, Distal Esophagus Biopsy, and 
consists of a 0.4



   x 0.3 x 0.1 cm pink-white irregular soft tissue fragment which is submitted 
entirely in one



   cassette.



   



   Signed by and Reported on: __________              Khoa Zeng MD  1211



   



   -----------------------------------------------------------------------------
---------------



   



   



   ** END OF REPORT **



   



   DEPARTMENT OF PATHOLOGY,  [address] [place] [address]



   Phone # [phone]      Fax #[phone]



   Khoa Zeng M.D. Director     [facility] # [id number]







Procedures







 Date  Code  Description  Status

 

 2012  04936906  Colonoscopy  Completed

 

 2012  10812497  Mammogram  Completed







[employer]







 Description

 

 No Information Available







Encounters







 Type  Date  Location  Provider  Dx  Diagnosis

 

 Office Visit  10/04/2019  Nancy Henderson,  E78.2  Mixed hyperlipidemia



   9:30a    M.D.    









 I10  Essential (primary) hypertension

 

 M51.37  Other intervertebral disc degeneration, lumbosacral region

 

 E11.65  Type 2 diabetes mellitus with hyperglycemia

 

 F33.9  Major depressive disorder, recurrent, unspecified

 

 F43.12  Post-traumatic stress disorder, chronic

 

 E66.01  Morbid (severe) obesity due to excess calories

 

 Z91.030  Bee allergy status

 

 J45.909  Unspecified asthma, uncomplicated

 

 M75.111  Incomplete rotatr-cuff tear/ruptr of r shoulder, not trauma

 

 M25.511  Pain in right shoulder

 

 M15.9  Polyosteoarthritis, unspecified

 

 K21.0  Gastro-esophageal reflux disease with esophagitis

 

 M25.559  Pain in unspecified hip

 

 H69.83  Other specified disorders of Eustachian tube, bilateral

 

 K30  Functional dyspepsia

 

 E03.9  Hypothyroidism, unspecified

 

 H81.13  Benign paroxysmal vertigo, bilateral

 

 G47.00  Insomnia, unspecified

 

 J30.9  Allergic rhinitis, unspecified

 

 L20.9  Atopic dermatitis, unspecified

 

 H92.03  Otalgia, bilateral

 

 M54.5  Low back pain

 

 G47.33  Obstructive sleep apnea (adult) (pediatric)

 

 J32.9  Chronic sinusitis, unspecified

 

 N95.1  Menopausal and female climacteric states

 

 R53.83  Other fatigue

 

 F41.9  Anxiety disorder, unspecified

 

 H69.90  Unspecified Eustachian tube disorder, unspecified ear

 

 E55.9  Vitamin D deficiency, unspecified

 

 N20.0  Calculus of kidney

 

 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

 

 S50.862A  Insect bite (nonvenomous) of left forearm, initial encounter









 Office Visit  2019 11:15a  [facility]  Nancy Mullins  E78.2  Mixed 
hyperlipidemia



       YADIRA JOHN    









 I10  Essential (primary) hypertension

 

 M51.37  Other intervertebral disc degeneration, lumbosacral region

 

 E11.65  Type 2 diabetes mellitus with hyperglycemia

 

 F33.9  Major depressive disorder, recurrent, unspecified

 

 F43.12  Post-traumatic stress disorder, chronic

 

 E66.01  Morbid (severe) obesity due to excess calories

 

 Z91.030  Bee allergy status

 

 J45.909  Unspecified asthma, uncomplicated

 

 M75.111  Incomplete rotatr-cuff tear/ruptr of r shoulder, not trauma

 

 M25.511  Pain in right shoulder

 

 M15.9  Polyosteoarthritis, unspecified

 

 K21.0  Gastro-esophageal reflux disease with esophagitis

 

 M25.559  Pain in unspecified hip

 

 H69.83  Other specified disorders of Eustachian tube, bilateral

 

 K30  Functional dyspepsia

 

 E03.9  Hypothyroidism, unspecified

 

 H81.13  Benign paroxysmal vertigo, bilateral

 

 G47.00  Insomnia, unspecified

 

 J30.9  Allergic rhinitis, unspecified

 

 L20.9  Atopic dermatitis, unspecified

 

 H92.03  Otalgia, bilateral

 

 M54.5  Low back pain

 

 G47.33  Obstructive sleep apnea (adult) (pediatric)

 

 J32.9  Chronic sinusitis, unspecified

 

 N95.1  Menopausal and female climacteric states

 

 R53.83  Other fatigue

 

 F41.9  Anxiety disorder, unspecified

 

 H69.90  Unspecified Eustachian tube disorder, unspecified ear

 

 E55.9  Vitamin D deficiency, unspecified

 

 N20.0  Calculus of kidney

 

 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









 Office Visit  2019  4:30p  [facility]  Nancy Mullins  E78.2  Mixed 
hyperlipidemia



       YADIRA JOHN    









 I10  Essential (primary) hypertension

 

 M51.37  Other intervertebral disc degeneration, lumbosacral region

 

 E11.65  Type 2 diabetes mellitus with hyperglycemia

 

 F33.9  Major depressive disorder, recurrent, unspecified

 

 F43.12  Post-traumatic stress disorder, chronic

 

 E66.01  Morbid (severe) obesity due to excess calories

 

 Z91.030  Bee allergy status

 

 J45.909  Unspecified asthma, uncomplicated

 

 M75.111  Incomplete rotatr-cuff tear/ruptr of r shoulder, not trauma

 

 M25.511  Pain in right shoulder

 

 M15.9  Polyosteoarthritis, unspecified

 

 K21.0  Gastro-esophageal reflux disease with esophagitis

 

 M25.559  Pain in unspecified hip

 

 H69.83  Other specified disorders of Eustachian tube, bilateral

 

 K30  Functional dyspepsia

 

 E03.9  Hypothyroidism, unspecified

 

 H81.13  Benign paroxysmal vertigo, bilateral

 

 G47.00  Insomnia, unspecified

 

 J30.9  Allergic rhinitis, unspecified

 

 L20.9  Atopic dermatitis, unspecified

 

 H92.03  Otalgia, bilateral

 

 M54.5  Low back pain

 

 G47.33  Obstructive sleep apnea (adult) (pediatric)

 

 J32.9  Chronic sinusitis, unspecified

 

 N95.1  Menopausal and female climacteric states

 

 R53.83  Other fatigue

 

 F41.9  Anxiety disorder, unspecified

 

 H69.90  Unspecified Eustachian tube disorder, unspecified ear

 

 E55.9  Vitamin D deficiency, unspecified

 

 N20.0  Calculus of kidney

 

 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









 Office Visit  2019  9:15a  [facility]  Kirill De Santiago  E78.2  Mixed 
hyperlipidemia



       N.P.    









 I10  Essential (primary) hypertension

 

 M51.37  Other intervertebral disc degeneration, lumbosacral region

 

 E11.65  Type 2 diabetes mellitus with hyperglycemia

 

 F33.9  Major depressive disorder, recurrent, unspecified

 

 F43.12  Post-traumatic stress disorder, chronic

 

 E66.01  Morbid (severe) obesity due to excess calories

 

 Z91.030  Bee allergy status

 

 J45.909  Unspecified asthma, uncomplicated

 

 M75.111  Incomplete rotatr-cuff tear/ruptr of r shoulder, not trauma

 

 M25.511  Pain in right shoulder

 

 M15.9  Polyosteoarthritis, unspecified

 

 K21.0  Gastro-esophageal reflux disease with esophagitis

 

 M25.559  Pain in unspecified hip

 

 H69.83  Other specified disorders of Eustachian tube, bilateral

 

 K30  Functional dyspepsia

 

 E03.9  Hypothyroidism, unspecified

 

 H81.13  Benign paroxysmal vertigo, bilateral

 

 G47.00  Insomnia, unspecified

 

 J30.9  Allergic rhinitis, unspecified

 

 L20.9  Atopic dermatitis, unspecified

 

 H92.03  Otalgia, bilateral

 

 M54.5  Low back pain

 

 G47.33  Obstructive sleep apnea (adult) (pediatric)

 

 J32.9  Chronic sinusitis, unspecified

 

 N95.1  Menopausal and female climacteric states

 

 R53.83  Other fatigue

 

 F41.9  Anxiety disorder, unspecified

 

 H69.90  Unspecified Eustachian tube disorder, unspecified ear

 

 R06.02  Shortness of breath

 

 R05  Cough

 

 E55.9  Vitamin D deficiency, unspecified

 

 H66.93  Otitis media, unspecified, bilateral







Assessments







 Date  Code  Description  Provider

 

 10/04/2019  E78.2  Mixed hyperlipidemia  Nancy Mullins M.D.

 

 10/04/2019  I10  Essential (primary) hypertension  Nancy Mullins M.D.

 

 10/04/2019  M51.37  Other intervertebral disc degeneration,  Nancy Mullins M.D.



     lumbosacral region  

 

 10/04/2019  E11.65  Type 2 diabetes mellitus with hyperglycemia  Nancy Mullins M.D.

 

 10/04/2019  F33.9  Major depressive disorder, recurrent,  Nancy Mullins M.D.



     unspecified  

 

 10/04/2019  F43.12  Post-traumatic stress disorder, chronic  Nancy Mullins M.D.

 

 10/04/2019  E66.01  Morbid (severe) obesity due to excess  Nancy Mullins M.D.



     calories  

 

 10/04/2019  Z91.030  Bee allergy status  Nancy Mullins M.D.

 

 10/04/2019  J45.909  Unspecified asthma, uncomplicated  Nancy Mullins M.D.

 

 10/04/2019  M75.111  Incomplete rotator cuff tear or rupture of  Nancy Mullins M.D.



     right shoulder, not specified as traumatic  

 

 10/04/2019  M25.511  Pain in right shoulder  Nancy Mullins M.D.

 

 10/04/2019  M15.9  Polyosteoarthritis, unspecified  Nancy Mullins M.D.

 

 10/04/2019  K21.0  Gastro-esophageal reflux disease with  Nancy Mullins M.D.



     esophagitis  

 

 10/04/2019  M25.559  Pain in unspecified hip  Nancy Mullins M.D.

 

 10/04/2019  H69.83  Other specified disorders of Eustachian  Nancy Mullins M.D.



     tube, bilateral  

 

 10/04/2019  K30  Functional dyspepsia  Nancy Mullins M.D.

 

 10/04/2019  E03.9  Hypothyroidism, unspecified  Nancy Mullins M.D.

 

 10/04/2019  H81.13  Benign paroxysmal vertigo, bilateral  Nancy Mullins M.D.

 

 10/04/2019  G47.00  Insomnia, unspecified  Nancy Mullins M.D.

 

 10/04/2019  J30.9  Allergic rhinitis, unspecified  Nancy Mullins M.D.

 

 10/04/2019  L20.9  Atopic dermatitis, unspecified  Nancy Mullins M.D.

 

 10/04/2019  H92.03  Otalgia, bilateral  Nancy Mullins M.D.

 

 10/04/2019  M54.5  Low back pain  Nancy Mullins M.D.

 

 10/04/2019  G47.33  Obstructive sleep apnea (adult) (pediatric)  Nancy Mullins M.D.

 

 10/04/2019  J32.9  Chronic sinusitis, unspecified  Nancy Mullins M.D.

 

 10/04/2019  N95.1  Menopausal and female climacteric states  Nancy Mullins M.D.

 

 10/04/2019  R53.83  Other fatigue  Nancy Mullins M.D.

 

 10/04/2019  F41.9  Anxiety disorder, unspecified  Nancy Mullins M.D.

 

 10/04/2019  H69.90  Unspecified Eustachian tube disorder,  Nancy Mullins M.D.



     unspecified ear  

 

 10/04/2019  E55.9  Vitamin D deficiency, unspecified  Nancy Mullins M.D.

 

 10/04/2019  N20.0  Calculus of kidney  Nancy Mullins M.D.

 

 10/04/2019  D37.8  Neoplasm of uncertain behavior of other  Nancy Mullins M.D.



     specified digestive organs  

 

 10/04/2019  S50.862A  Insect bite (nonvenomous) of left forearm,  Nancy Mullins M.D.



     initial encounter  

 

 2019  E78.2  Mixed hyperlipidemia  Nancy Mullins M.D.

 

 2019  I10  Essential (primary) hypertension  Nancy Mullins M.D.

 

 2019  M51.37  Other intervertebral disc degeneration,  Nancy Mullins M.D.



     lumbosacral region  

 

 2019  E11.65  Type 2 diabetes mellitus with hyperglycemia  Nancy Mullins M.D.

 

 2019  F33.9  Major depressive disorder, recurrent,  Nancy Mullins M.D.



     unspecified  

 

 2019  F43.12  Post-traumatic stress disorder, chronic  Nancy Mullins M.D.

 

 2019  E66.01  Morbid (severe) obesity due to excess  Nancy Mullins M.D.



     calories  

 

 2019  Z91.030  Bee allergy status  Nancy Mullins M.D.

 

 2019  J45.909  Unspecified asthma, uncomplicated  Nancy Mullins M.D.

 

 2019  M75.111  Incomplete rotator cuff tear or rupture of  Nancy Mullins M.D.



     right shoulder, n  

 

 2019  M25.511  Pain in right shoulder  Nancy Mullins M.D.

 

 2019  M15.9  Polyosteoarthritis, unspecified  Nancy Mullins M.D.

 

 2019  K21.0  Gastro-esophageal reflux disease with  Nancy Mullins M.D.



     esophagitis  

 

 2019  M25.559  Pain in unspecified hip  Nancy Mullins M.D.

 

 2019  H69.83  Other specified disorders of Eustachian  Nancy Mullins M.D.



     tube, bilateral  

 

 2019  K30  Functional dyspepsia  Nancy Mullins M.D.

 

 2019  E03.9  Hypothyroidism, unspecified  Nancy Mullins M.D.

 

 2019  H81.13  Benign paroxysmal vertigo, bilateral  Nancy Mullins M.D.

 

 2019  G47.00  Insomnia, unspecified  Nancy Mullins M.D.

 

 2019  J30.9  Allergic rhinitis, unspecified  Nancy Mullins M.D.

 

 2019  L20.9  Atopic dermatitis, unspecified  Nancy Mullins M.D.

 

 2019  H92.03  Otalgia, bilateral  Nancy Mullins M.D.

 

 2019  M54.5  Low back pain  Nancy Mullins M.D.

 

 2019  G47.33  Obstructive sleep apnea (adult) (pediatric)  Nancy Mullins M.D.

 

 2019  J32.9  Chronic sinusitis, unspecified  Nancy Mullins M.D.

 

 2019  N95.1  Menopausal and female climacteric states  Nancy Mullins M.D.

 

 2019  R53.83  Other fatigue  Nancy Mullins M.D.

 

 2019  F41.9  Anxiety disorder, unspecified  Nancy Mullins M.D.

 

 2019  H69.90  Unspecified Eustachian tube disorder,  Nancy Mullins M.D.



     unspecified ear  

 

 2019  E55.9  Vitamin D deficiency, unspecified  Nancy Mullins M.D.

 

 2019  N20.0  Calculus of kidney  Nancy Mullins M.D.

 

 2019  D37.8  Neoplasm of uncertain behavior of other  Nancy Mullins M.D.



     specified digestive organs  

 

 2019  E78.2  Mixed hyperlipidemia  Nancy Mullins M.D.

 

 2019  I10  Essential (primary) hypertension  Nancy Mullins M.D.

 

 2019  M51.37  Other intervertebral disc degeneration,  Nancy Mullins M.D.



     lumbosacral region  

 

 2019  E11.65  Type 2 diabetes mellitus with hyperglycemia  Nancy Mullins M.D.

 

 2019  F33.9  Major depressive disorder, recurrent,  Nancy Mullins M.D.



     unspecified  

 

 2019  F43.12  Post-traumatic stress disorder, chronic  Nancy Mullins M.D.

 

 2019  E66.01  Morbid (severe) obesity due to excess  Nancy Mullins M.D.



     calories  

 

 2019  Z91.030  Bee allergy status  Nancy Mullins M.D.

 

 2019  J45.909  Unspecified asthma, uncomplicated  Nancy Mullins M.D.

 

 2019  M75.111  Incomplete rotator cuff tear or rupture of  Nancy Mullins M.D.



     right shoulder, n  

 

 2019  M25.511  Pain in right shoulder  Nancy Mullins M.D.

 

 2019  M15.9  Polyosteoarthritis, unspecified  Nancy Mullins M.D.

 

 2019  K21.0  Gastro-esophageal reflux disease with  Nancy Mullins M.D.



     esophagitis  

 

 2019  M25.559  Pain in unspecified hip  Nancy Mullins M.D.

 

 2019  H69.83  Other specified disorders of Eustachian  Nancy Mullins M.D.



     tube, bilateral  

 

 2019  K30  Functional dyspepsia  Nancy Mullins M.D.

 

 2019  E03.9  Hypothyroidism, unspecified  Nancy Mullins M.D.

 

 2019  H81.13  Benign paroxysmal vertigo, bilateral  Nancy Mullins M.D.

 

 2019  G47.00  Insomnia, unspecified  Nancy Mullins M.D.

 

 2019  J30.9  Allergic rhinitis, unspecified  Nancy Mullins M.D.

 

 2019  L20.9  Atopic dermatitis, unspecified  Nancy Mullins M.D.

 

 2019  H92.03  Otalgia, bilateral  Nancy Mullins M.D.

 

 2019  M54.5  Low back pain  Nancy Mullins M.D.

 

 2019  G47.33  Obstructive sleep apnea (adult) (pediatric)  Nancy Mullins M.D.

 

 2019  J32.9  Chronic sinusitis, unspecified  Nancy Mullins M.D.

 

 2019  N95.1  Menopausal and female climacteric states  Nancy Mullins M.D.

 

 2019  R53.83  Other fatigue  Nancy Mullins M.D.

 

 2019  F41.9  Anxiety disorder, unspecified  Nancy Mullins M.D.

 

 2019  H69.90  Unspecified Eustachian tube disorder,  Nancy Mullins M.D.



     unspecified ear  

 

 2019  E55.9  Vitamin D deficiency, unspecified  Nancy Mullins M.D.

 

 2019  N20.0  Calculus of kidney  Nancy Mullins M.D.

 

 2019  D37.8  Neoplasm of uncertain behavior of other  Nancy Mullins M.D.



     specified digestive organs  

 

 2019  E78.2  Mixed hyperlipidemia  Nancy Mullins M.D.

 

 2019  I10  Essential (primary) hypertension  Nancy Mullins M.D.

 

 2019  M51.37  Other intervertebral disc degeneration,  Nancy Mullins M.D.



     lumbosacral region  

 

 2019  E11.65  Type 2 diabetes mellitus with hyperglycemia  Nancy Mullins M.D.

 

 2019  F33.9  Major depressive disorder, recurrent,  Nancy Mullins M.D.



     unspecified  

 

 2019  F43.12  Post-traumatic stress disorder, chronic  Nancy Mullins M.D.

 

 2019  E66.01  Morbid (severe) obesity due to excess  Nancy Mullins M.D.



     calories  

 

 2019  Z91.030  Bee allergy status  Nancy Mullins M.D.

 

 2019  J45.909  Unspecified asthma, uncomplicated  Nancy Mullins M.D.

 

 2019  M75.111  Incomplete rotator cuff tear or rupture of  Nancy Mullins M.D.



     right shoulder, n  

 

 2019  M25.511  Pain in right shoulder  Nancy Mullins M.D.

 

 2019  M15.9  Polyosteoarthritis, unspecified  Nancy Mullins M.D.

 

 2019  K21.0  Gastro-esophageal reflux disease with  Nancy Mullins M.D.



     esophagitis  

 

 2019  M25.559  Pain in unspecified hip  Nancy Mullins M.D.

 

 2019  H69.83  Other specified disorders of Eustachian  Nancy Mullins M.D.



     tube, bilateral  

 

 2019  K30  Functional dyspepsia  Nancy Mullins M.D.

 

 2019  E03.9  Hypothyroidism, unspecified  Nancy Mullins M.D.

 

 2019  H81.13  Benign paroxysmal vertigo, bilateral  Nancy Mullins M.D.

 

 2019  G47.00  Insomnia, unspecified  Nancy Mullins M.D.

 

 2019  J30.9  Allergic rhinitis, unspecified  Nancy Mullins M.D.

 

 2019  L20.9  Atopic dermatitis, unspecified  Nancy Mullins M.D.

 

 2019  H92.03  Otalgia, bilateral  Nancy Mullins M.D.

 

 2019  M54.5  Low back pain  Nancy Mullins M.D.

 

 2019  G47.33  Obstructive sleep apnea (adult) (pediatric)  Nancy Mullins M.D.

 

 2019  J32.9  Chronic sinusitis, unspecified  Nancy Mullins M.D.

 

 2019  N95.1  Menopausal and female climacteric states  Nancy Mullins M.D.

 

 2019  R53.83  Other fatigue  Nancy Mullins M.D.

 

 2019  F41.9  Anxiety disorder, unspecified  Nancy Mullins M.D.

 

 2019  H69.90  Unspecified Eustachian tube disorder,  Nancy Mullins M.D.



     unspecified ear  

 

 2019  E55.9  Vitamin D deficiency, unspecified  Nancy Mullins M.D.

 

 2019  E78.2  Mixed hyperlipidemia  Kirill De Santiago N.PStuart

 

 2019  I10  Essential (primary) hypertension  Kirill De Santiago, N.P.

 

 2019  M51.37  Other intervertebral disc degeneration,  Kirill De Santiago 
N.P.



     lumbosacral region  

 

 2019  E11.65  Type 2 diabetes mellitus with hyperglycemia  Kirill De Santiago N.P.

 

 2019  F33.9  Major depressive disorder, recurrent,  Kirill De Santiago N.P.



     unspecified  

 

 2019  F43.12  Post-traumatic stress disorder, chronic  Kirill De Santiago 
N.P.

 

 2019  E66.01  Morbid (severe) obesity due to excess  Kirill De Santiago, N.P.



     calories  

 

 2019  Z91.030  Bee allergy status  Kirill De Santiago N.P.

 

 2019  J45.909  Unspecified asthma, uncomplicated  Kirill De Santiago, N.P.

 

 2019  M75.111  Incomplete rotator cuff tear or rupture of  Kirill De Santiago N.PStuart



     right shoulder, n  

 

 2019  M25.511  Pain in right shoulder  Kirill De Santiago N.P.

 

 2019  M15.9  Polyosteoarthritis, unspecified  Kirill De Santiago, N.P.

 

 2019  K21.0  Gastro-esophageal reflux disease with  Kirill De Santiago N.PStuart



     esophagitis  

 

 2019  M25.559  Pain in unspecified hip  Kirill De Santiago, N.P.

 

 2019  H69.83  Other specified disorders of Eustachian  Kirill De Santiago, 
N.P.



     tube, bilateral  

 

 2019  K30  Functional dyspepsia  Kirill De Santiago N.P.

 

 2019  E03.9  Hypothyroidism, unspecified  Kirill De Santiago, N.P.

 

 2019  H81.13  Benign paroxysmal vertigo, bilateral  Kirill De Santiago, N.P.

 

 2019  G47.00  Insomnia, unspecified  Kirill De Santiago, N.P.

 

 2019  J30.9  Allergic rhinitis, unspecified  Kirill De Santiago, N.P.

 

 2019  L20.9  Atopic dermatitis, unspecified  Kirill De Santiago, N.P.

 

 2019  H92.03  Otalgia, bilateral  Kirill De Santiago, N.P.

 

 2019  M54.5  Low back pain  Kirill De Santiago N.P.

 

 2019  G47.33  Obstructive sleep apnea (adult) (pediatric)  Kirill De Santiago N.P.

 

 2019  J32.9  Chronic sinusitis, unspecified  Kirill De Santiago N.P.

 

 2019  N95.1  Menopausal and female climacteric states  Kirill De Santiago 
N.P.

 

 2019  R53.83  Other fatigue  Kirill De Santiago N.P.

 

 2019  F41.9  Anxiety disorder, unspecified  Kirill De Santiago N.P.

 

 2019  H69.90  Unspecified Eustachian tube disorder,  Kirill De Santiago, N.P.



     unspecified ear  

 

 2019  R06.02  Shortness of breath  Kirill De Santiago N.P.

 

 2019  R05  Cough  Kirill De Santiago N.P.

 

 2019  E55.9  Vitamin D deficiency, unspecified  Kirill De Santiago N.P.

 

 2019  H66.93  Otitis media, unspecified, bilateral  Kirill De Santiago N.P.







Plan of Treatment

10/04/2019 - Nancy Mullins M.D.E78.2 Mixed hyperlipidemiaComments:DIET 
REVIEWED CONTINUE DIETWT LOSSF/U LAB [id number] Essential (primary) 
hypertensionComments:CHECK BP TIW ( PRN)F/U LABDIET AND FLUID COUNSELING LOW 
SODIUM DIETWT LOSSF/U LABM51.37 Other intervertebral disc degeneration, 
lumbosacral regionComments:EXERCISE/HEAT /MESSAGEAVOID HEAVY LIFTING WT 
LOSSTYLENOL OR MOTRIN PRNE11.65 Type 2 diabetes mellitus with 
hyperglycemiaComments:DIET REVIEWED CONTINUE DIETWT LOSSF/U LAB FS qAC AND HS 
PRN F/U FBWF33.9 Major depressive disorder, recurrent, unspecifiedComments:
COUNCELLING AND REASSURANCE RELAXATION TECHNIQUES DISCUSSED COUNSELED RE: 
STRESSORS IN LIFEF43.12 Post-traumatic stress disorder, chronicComments:
COUNCELLING AND REASSURANCE RELAXATION TECHNIQUES DISCUSSED COUNSELED RE: 
STRESSORS IN LIFEE66.01 Morbid (severe) obesity due to excess caloriesComments:
WT LOSS COUNCELLINGEXERCISEDIET KFRJBCKHUIVL18.030 Bee allergy statusComments:
INSTRUCTION AND TEACHING ON AVOIDANCE AND TREATMENTS IN CASE OF JNRJQVDCR59.909 
Unspecified asthma, uncomplicatedComments:MDI / NEBULIZER [place] PRN AVOID EXPOSURE 
TO SMOKING OR NJVYQP41.111 Incomplete rotator cuff tear or rupture of right 
shoulder, not specified as traumaticComments:EXERCISE/HEAT/MESSAGE TYLENOL OR 
MOTRIN PRNAVOID HEAVY LIFTING ELEVATE PRNM25.511 Pain in right shoulderComments:
EXERCISE/HEAT /MESSAGE AVOID HEAVY LIFTINGTYLENOL OR MOTRIN PRNM15.9 
Polyosteoarthritis, unspecifiedComments:EXERCISE/HEAT/MESSAGETYLENOL OR MOTRIN 
PRNAVOID HEAVY LIFTINGWT LOSSK21.0 Gastro-esophageal reflux disease with 
esophagitisNew Medication:Pantoprazole Sodium 40 mg - 1 by mouth every 
dayComments:AVOID CAFFEINE, ETOH AND SPICY FOODSTUMS OR MYLANTA PRN CALL WITH 
PROBLEMS OR JOCZZWSBN88.559 Pain in unspecified hipComments:EXERCISE/HEAT/
MESSAGETYLENOL OR MOTRIN PRNAVOID HEAVY LIFTINGWT LOSSH69.83 Other specified 
disorders of Eustachian tube, bilateralComments:INCREASE PO FLUID USE 
ANTIHISTAMINE PRNTYLENOL OR MOTRIN PRN  AVOID SECOND HAND [id number] Functional 
dyspepsiaComments:AVOID CAFFEINE, ETOH AND SPICY FOODSTUMS OR MYLANTA PRN CALL 
WITH PROBLEMS OR MODZMYBGE63.9 Hypothyroidism, unspecifiedComments:RX REVIEWED 
AND UPDATEDF/U TSH/ FT4H81.13 Benign paroxysmal vertigo, bilateralComments:
YBFCUVU58.00 Insomnia, unspecifiedComments:COUNCELLING AND REASSURANCE 
RELAXATION TECHNIQUES DISCUSSED COUNSELED RE: STRESSORS IN LIFE  TYLENOLPM OR 
MOTRIN PM PRN   DUR APRZSOPM99.9 Allergic rhinitis, unspecifiedNew Medication:
Loratadine 10 mg - 1 tab by mouth every dayComments:INCREASE PO FLUID USE 
ANTIHISTAMINE PRN SECOND HAND SMOKING AVOIDANCEL20.9 Atopic dermatitis, 
unspecifiedNew Medication:Triamcinolone Acetonide 0.5 % - apply to affected 
area twice a day as neededComments:SKIN CARE INSTRUCTIONS  LOTION OR BABY OIL 2-
3  APPLICATION PER DAYUSE MOISTURIZING SOAPAVOID PROLONGED WATER EXPOSUREAVOID 
USING HOT WATER IN MWUNZRQ16.03 Otalgia, bilateralComments:INCREASE PO 
FLUIDTYLENOL OR MOTRIN PRNANTIHISTAMINE VLZNHYUC73.5 Low back painComments:
EXERCISE/HEAT /MESSAGEAVOID HEAVY LIFTING WT LOSSTYLENOL OR MOTRIN PRN   DUR 
HJGMOOBV78.33 Obstructive sleep apnea (adult) (pediatric)Comments:CONTINUE WITH 
CPAPF/U WITH ENT PRNJ32.9 Chronic sinusitis, unspecifiedComments:INCREASE PO 
FLUID TYLENOL OR MOTRIN PRN USE ANTIHISTAMINE PRNN95.1 Menopausal and female 
climacteric statesComments:COUNCELLING AND REASSURANCECALCIUM DPSXFNAIZR97.83 
Other fatigueComments:INCRFEASE PO FLUIDCOUNCELLING AND REASSURANCE  RESTF41.9 
Anxiety disorder, unspecifiedComments:COUNCELLING AND REASSURANCE RELAXATION 
TECHNIQUES DISCUSSEDCOUNSELED RE: STRESSORS IN LIFE AVOID ALLENERGY/HIGH 
CAFFEINE DRINKS  DUR OPBKVFJO91.90 Unspecified Eustachian tube disorder, 
unspecified earComments:ANTIHISTAMINE PRN  TYLENOL OR MOTRIN PRN INCREASE PO 
TNEZKE88.9 Vitamin D deficiency, unspecifiedComments:INCREASE EXPOSURE TO 
SUNREVIEW OF DIETN20.0 Calculus of kidneyComments:F/U WITH UROLOGY PRND37.8 
Neoplasm of uncertain behavior of other specified digestive organsComments:MRA 
OF ABD. 19  WNLS50.862A Insect bite (nonvenomous) of left forearm, initial 
encounterNew Medication:Triamcinolone Acetonide 0.5 % - apply to affected area 
twice a day as neededComments:SKIN CARE INSTRUCTIONS  LOTION OR BABY OIL 2-3  
APPLICATION PER DAYUSE MOISTURIZING SOAP



Functional Status







 Description

 

 No Information Available







Mental Status







 Description

 

 No Information Available







Referrals







 Description

 

 No Information Available